# Patient Record
Sex: FEMALE | Race: WHITE | Employment: UNEMPLOYED | URBAN - METROPOLITAN AREA
[De-identification: names, ages, dates, MRNs, and addresses within clinical notes are randomized per-mention and may not be internally consistent; named-entity substitution may affect disease eponyms.]

---

## 2017-01-10 ENCOUNTER — TELEPHONE (OUTPATIENT)
Dept: FAMILY MEDICINE CLINIC | Age: 62
End: 2017-01-10

## 2017-01-10 NOTE — TELEPHONE ENCOUNTER
Call Paula Camp at [facility] got a recording she is out of the office today she is only in the office on M,W,F. Max Sacks for her to return my call in regards to pt.

## 2017-01-11 ENCOUNTER — TELEPHONE (OUTPATIENT)
Dept: FAMILY MEDICINE CLINIC | Age: 62
End: 2017-01-11

## 2017-01-11 DIAGNOSIS — D36.10 SCHWANNOMA: Primary | ICD-10-CM

## 2017-01-11 NOTE — TELEPHONE ENCOUNTER
Spoke with Lacie Ramírez from [place]. She states pt can be seen by her pcp or a specialist. As long as someone is following up on her condition. 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 Information will be faxed to our office. Also pt has to be seen yearly to make sure mass has not regrown or developed. If mass does develop pt would have to see a thoracic surgeon.

## 2017-01-13 NOTE — TELEPHONE ENCOUNTER
Informed pt that CT scan was faxed over to [facility] and that she can stop by to  her records here at the office. Pt states she will stop by next week to  records.

## 2017-02-10 RX ORDER — POTASSIUM CHLORIDE 750 MG/1
10 CAPSULE, EXTENDED RELEASE ORAL 2 TIMES DAILY
Qty: 60 CAP | Refills: 2 | Status: SHIPPED | OUTPATIENT
Start: 2017-02-10 | End: 2017-10-10 | Stop reason: SDUPTHER

## 2017-02-10 NOTE — TELEPHONE ENCOUNTER
Requested Prescriptions     Pending Prescriptions Disp Refills    potassium chloride SA (MICRO-K) 10 mEq capsule 60 Cap 2     Sig: Take 1 Cap by mouth two (2) times a day.

## 2017-02-13 ENCOUNTER — TELEPHONE (OUTPATIENT)
Dept: FAMILY MEDICINE CLINIC | Age: 62
End: 2017-02-13

## 2017-02-13 NOTE — TELEPHONE ENCOUNTER
Per fax from [place] with [facility], pt declined appt request. States refer pt to mental health.

## 2017-02-22 ENCOUNTER — OFFICE VISIT (OUTPATIENT)
Dept: FAMILY MEDICINE CLINIC | Age: 62
End: 2017-02-22

## 2017-02-22 VITALS
HEART RATE: 94 BPM | DIASTOLIC BLOOD PRESSURE: 79 MMHG | HEIGHT: 68 IN | RESPIRATION RATE: 18 BRPM | BODY MASS INDEX: 31.16 KG/M2 | OXYGEN SATURATION: 99 % | WEIGHT: 205.6 LBS | TEMPERATURE: 96.8 F | SYSTOLIC BLOOD PRESSURE: 111 MMHG

## 2017-02-22 DIAGNOSIS — R31.9 URINARY TRACT INFECTION WITH HEMATURIA, SITE UNSPECIFIED: ICD-10-CM

## 2017-02-22 DIAGNOSIS — N39.0 URINARY TRACT INFECTION WITH HEMATURIA, SITE UNSPECIFIED: ICD-10-CM

## 2017-02-22 DIAGNOSIS — M79.10 MUSCLE PAIN: ICD-10-CM

## 2017-02-22 DIAGNOSIS — R30.9 PAINFUL URINATION: Primary | ICD-10-CM

## 2017-02-22 LAB
BILIRUB UR QL STRIP: NEGATIVE
GLUCOSE UR-MCNC: NEGATIVE MG/DL
KETONES P FAST UR STRIP-MCNC: NEGATIVE MG/DL
PH UR STRIP: 7 [PH] (ref 4.6–8)
PROT UR QL STRIP: NEGATIVE MG/DL
SP GR UR STRIP: 1.01 (ref 1–1.03)
UA UROBILINOGEN AMB POC: NORMAL (ref 0.2–1)
URINALYSIS CLARITY POC: CLEAR
URINALYSIS COLOR POC: YELLOW
URINE BLOOD POC: NORMAL
URINE LEUKOCYTES POC: NORMAL
URINE NITRITES POC: NEGATIVE

## 2017-02-22 RX ORDER — INDOMETHACIN 25 MG/1
50 CAPSULE ORAL 3 TIMES DAILY
Qty: 180 CAP | Refills: 2 | Status: SHIPPED | OUTPATIENT
Start: 2017-02-22 | End: 2017-07-21 | Stop reason: SDUPTHER

## 2017-02-22 RX ORDER — SULFAMETHOXAZOLE AND TRIMETHOPRIM 800; 160 MG/1; MG/1
1 TABLET ORAL 2 TIMES DAILY
Qty: 20 TAB | Refills: 0 | Status: SHIPPED | OUTPATIENT
Start: 2017-02-22 | End: 2017-03-04

## 2017-02-22 NOTE — PATIENT INSTRUCTIONS
Don't take potassium while you are taking bactrim. Take Bactrim until finished. Take indocin 25-50mg three times a day as needed for pain. If your urine symptoms get worse or are not much improved we will get imaging next week to check for kidney stone.   Drink plenty of water!!

## 2017-02-22 NOTE — PROGRESS NOTES
Chief Complaint   Patient presents with    Muscle Pain    Dysuria     1 week ago painful urination. Pt states she has not heard any thing from pain management. Ct was not done due to family member being sick. 1. Have you been to the ER, urgent care clinic since your last visit? Hospitalized since your last visit? No    2. Have you seen or consulted any other health care providers outside of the [address] [place] [address] since your last visit? Include any pap smears or colon screening.  No

## 2017-02-22 NOTE — MR AVS SNAPSHOT
Visit Information Date & Time Provider Department Dept. Phone Encounter #  
 2/22/2017 10:00 AM Adali Puente, LIYA [address] [phone] [id number] Upcoming Health Maintenance Date Due Hepatitis C Screening 1955 Pneumococcal 19-64 Medium Risk (1 of 1 - PPSV23) 4/26/1975 DTaP/Tdap/Td series (1 - Tdap) 4/26/1977 PAP AKA CERVICAL CYTOLOGY 4/26/1977 FOBT Q 1 YEAR AGE 50-75 4/26/2006 ZOSTER VACCINE AGE 60> 4/26/2016 BREAST CANCER SCRN MAMMOGRAM 7/30/2016 Allergies as of 2/22/2017  Review Complete On: 2/22/2017 By: Nadara Goldberg, LPN Severity Noted Reaction Type Reactions Morphine High 07/14/2016    Anaphylaxis Morphine  01/17/2013    Other (comments) Current Immunizations  Reviewed on 12/13/2016 Name Date Influenza Vaccine (Quad) PF 12/13/2016 Not reviewed this visit You Were Diagnosed With   
  
 Codes Comments Painful urination    -  Primary ICD-10-CM: R30.9 ICD-9-CM: 788.1 Muscle pain     ICD-10-CM: M79.1 ICD-9-CM: 729.1 Urinary tract infection with hematuria, site unspecified     ICD-10-CM: N39.0, R31.9 ICD-9-CM: 599.0 Vitals BP  
  
  
  
  
  
 111/79 Vitals History BMI and BSA Data Body Mass Index Body Surface Area  
 31.26 kg/m 2 2.12 m 2 Preferred Pharmacy Pharmacy Name Phone [facility] [address], [address] [facility] [phone] Your Updated Medication List  
  
   
This list is accurate as of: 2/22/17 10:22 AM.  Always use your most recent med list.  
  
  
  
  
 ARIPiprazole 15 mg tablet Commonly known as:  ABILIFY Take 15 mg by mouth daily. gabapentin 600 mg tablet Commonly known as:  NEURONTIN Take  by mouth three (3) times daily. hydrOXYzine HCl 50 mg tablet Commonly known as:  ATARAX Take 50 mg by mouth three (3) times daily as needed for Itching. indomethacin 25 mg capsule Commonly known as:  INDOCIN Take 2 Caps by mouth three (3) times daily. lamoTRIgine 25 mg tablet Commonly known as: LaMICtal  
Take  by mouth daily. lisinopril 5 mg tablet Commonly known as:  Carey Pinch Take 1 Tab by mouth daily. potassium chloride SA 10 mEq capsule Commonly known as:  usimilo Fernandezler Take 1 Cap by mouth two (2) times a day. QUEtiapine 50 mg tablet Commonly known as:  SEROquel Take 75 mg by mouth daily. rOPINIRole 0.25 mg tablet Commonly known as:  Shasha Glaze Take  by mouth three (3) times daily. sodium bicarbonate 650 mg tablet Take  by mouth four (4) times daily. trimethoprim-sulfamethoxazole 160-800 mg per tablet Commonly known as:  BACTRIM DS, SEPTRA DS Take 1 Tab by mouth two (2) times a day for 10 days. Prescriptions Sent to Pharmacy Refills  
 indomethacin (INDOCIN) 25 mg capsule 2 Sig: Take 2 Caps by mouth three (3) times daily. Class: Normal  
 Pharmacy: [id number] [address] Ph #: [phone] Route: Oral  
 trimethoprim-sulfamethoxazole (BACTRIM DS, SEPTRA DS) 160-800 mg per tablet 0 Sig: Take 1 Tab by mouth two (2) times a day for 10 days. Class: Normal  
 Pharmacy: [id number] [address] Ph #: [phone] Route: Oral  
  
We Performed the Following AMB POC URINALYSIS DIP STICK AUTO W/ MICRO [67712 CPT(R)] Patient Instructions Don't take potassium while you are taking bactrim. Take Bactrim until finished. Take indocin 25-50mg three times a day as needed for pain. If your urine symptoms get worse or are not much improved we will get imaging next week to check for kidney stone. Drink plenty of water!! Introducing \[facility]\"" & HEALTH SERVICES!    
 Mahi Reyes introduces [employer] patient portal. Now you can access parts of your medical record, email your doctor's office, and request medication refills online. 1. In your internet browser, go to [email or web address] 2. Click on the First Time User? Click Here link in the Sign In box. You will see the New Member Sign Up page. 3. Enter your WIB Access Code exactly as it appears below. You will not need to use this code after youve completed the sign-up process. If you do not sign up before the expiration date, you must request a new code. · WIB Access Code: [email or web address] Expires: 3/13/2017  1:53 PM 
 
4. Enter the last four digits of your Social Security Number (xxxx) and Date of Birth (mm/dd/yyyy) as indicated and click Submit. You will be taken to the next sign-up page. 5. Create a WIB ID. This will be your WIB login ID and cannot be changed, so think of one that is secure and easy to remember. 6. Create a WIB password. You can change your password at any time. 7. Enter your Password Reset Question and Answer. This can be used at a later time if you forget your password. 8. Enter your e-mail address. You will receive e-mail notification when new information is available in [address] 9. Click Sign Up. You can now view and download portions of your medical record. 10. Click the Download Summary menu link to download a portable copy of your medical information. If you have questions, please visit the Frequently Asked Questions section of the WIB website. Remember, WIB is NOT to be used for urgent needs. For medical emergencies, dial 911. Now available from your iPhone and Android! Please provide this summary of care documentation to your next provider. Your primary care clinician is listed as Kai Burnett. If you have any questions after today's visit, please call [phone].

## 2017-02-23 NOTE — PROGRESS NOTES
Chief Complaint   Patient presents with    Muscle Pain    Dysuria     she is a 62y.o. year old female who presents for evaluation of lower abd pain/dysuria feels like previous UTIs. Blood noted in urine yesterday. Has not been taking NSAIDs. Pt also denied pain management at this time and CT denied coverage - will follow up after [employer] coverage. Discussed possible early UTI or kidney stone, gave pt info and symptoms for worsening or reasons to return. Will treat with abx and NSAIDs and close follow up/imaging for any worsening. Reviewed and agree with Nurse Note duplicated in this note. Reviewed PmHx, RxHx, FmHx, SocHx, AllgHx and updated in chart. Patient Labs were reviewed: yes    Patient Past Records were reviewed:  yes    Review of Systems - negative except as listed above    Objective:     Vitals:    02/22/17 0951   BP: 111/79   Pulse: 94   Resp: 18   Temp: 96.8 °F (36 °C)   TempSrc: Oral   SpO2: 99%   Weight: 205 lb 9.6 oz (93.3 kg)   Height: 5' 8\" (1.727 m)     Physical Examination: General appearance - alert, well appearing, and in no distress  Mental status - alert, oriented to person, place, and time  Chest - clear to auscultation, no wheezes, rales or rhonchi, symmetric air entry  Heart - normal rate, regular rhythm, normal S1, S2, no murmurs, rubs, clicks or gallops  Abdomen - soft, nontender, nondistended, no masses or organomegaly  Extremities - peripheral pulses normal, no pedal edema, no clubbing or cyanosis    Assessment/ Plan:   Thalia Gutierrez was seen today for muscle pain and dysuria. Diagnoses and all orders for this visit:    Painful urination  -     AMB POC URINALYSIS DIP STICK AUTO W/ MICRO    Muscle pain  -     indomethacin (INDOCIN) 25 mg capsule; Take 2 Caps by mouth three (3) times daily. Urinary tract infection with hematuria, site unspecified  -     trimethoprim-sulfamethoxazole (BACTRIM DS, SEPTRA DS) 160-800 mg per tablet;  Take 1 Tab by mouth two (2) times a day for 10 days.       Encounter Diagnoses   Name Primary?  Painful urination Yes    Muscle pain     Urinary tract infection with hematuria, site unspecified      Orders Placed This Encounter    AMB POC URINALYSIS DIP STICK AUTO W/ MICRO    indomethacin (INDOCIN) 25 mg capsule    trimethoprim-sulfamethoxazole (BACTRIM DS, SEPTRA DS) 160-800 mg per tablet     Follow-up Disposition: Not on File  Patient Instructions   Don't take potassium while you are taking bactrim. Take Bactrim until finished. Take indocin 25-50mg three times a day as needed for pain. If your urine symptoms get worse or are not much improved we will get imaging next week to check for kidney stone. Drink plenty of water!!        I have discussed the diagnosis with the patient and the intended plan as seen in the above orders. The patient has received an After-Visit Summary and questions were answered concerning future plans.      Medication Side Effects and Warnings were discussed with patient: yes      Marli Moya NP-C  [employer]

## 2017-02-24 ENCOUNTER — TELEPHONE (OUTPATIENT)
Dept: FAMILY MEDICINE CLINIC | Age: 62
End: 2017-02-24

## 2017-02-24 NOTE — TELEPHONE ENCOUNTER
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

## 2017-02-27 ENCOUNTER — TELEPHONE (OUTPATIENT)
Dept: FAMILY MEDICINE CLINIC | Age: 62
End: 2017-02-27

## 2017-04-20 ENCOUNTER — OFFICE VISIT (OUTPATIENT)
Dept: FAMILY MEDICINE CLINIC | Age: 62
End: 2017-04-20

## 2017-04-20 VITALS
TEMPERATURE: 98.2 F | RESPIRATION RATE: 18 BRPM | WEIGHT: 205 LBS | HEIGHT: 68 IN | SYSTOLIC BLOOD PRESSURE: 146 MMHG | HEART RATE: 110 BPM | OXYGEN SATURATION: 98 % | DIASTOLIC BLOOD PRESSURE: 111 MMHG | BODY MASS INDEX: 31.07 KG/M2

## 2017-04-20 DIAGNOSIS — M79.604 CHRONIC PAIN OF BOTH LOWER EXTREMITIES: ICD-10-CM

## 2017-04-20 DIAGNOSIS — M79.605 CHRONIC PAIN OF BOTH LOWER EXTREMITIES: ICD-10-CM

## 2017-04-20 DIAGNOSIS — K59.03 DRUG-INDUCED CONSTIPATION: ICD-10-CM

## 2017-04-20 DIAGNOSIS — G89.29 CHRONIC PAIN OF BOTH LOWER EXTREMITIES: ICD-10-CM

## 2017-04-20 DIAGNOSIS — N39.0 URINARY TRACT INFECTION WITH HEMATURIA, SITE UNSPECIFIED: Primary | ICD-10-CM

## 2017-04-20 DIAGNOSIS — R31.9 URINARY TRACT INFECTION WITH HEMATURIA, SITE UNSPECIFIED: Primary | ICD-10-CM

## 2017-04-20 LAB
BILIRUB UR QL STRIP: NEGATIVE
GLUCOSE UR-MCNC: NEGATIVE MG/DL
KETONES P FAST UR STRIP-MCNC: NEGATIVE MG/DL
PH UR STRIP: 6.5 [PH] (ref 4.6–8)
PROT UR QL STRIP: NORMAL MG/DL
SP GR UR STRIP: 1.02 (ref 1–1.03)
UA UROBILINOGEN AMB POC: NORMAL (ref 0.2–1)
URINALYSIS CLARITY POC: NORMAL
URINALYSIS COLOR POC: NORMAL
URINE BLOOD POC: NORMAL
URINE LEUKOCYTES POC: NORMAL
URINE NITRITES POC: NEGATIVE

## 2017-04-20 RX ORDER — CIPROFLOXACIN 500 MG/1
500 TABLET ORAL 2 TIMES DAILY
Qty: 10 TAB | Refills: 0 | Status: SHIPPED | OUTPATIENT
Start: 2017-04-20 | End: 2017-04-25

## 2017-04-20 RX ORDER — TRAMADOL HYDROCHLORIDE 50 MG/1
50 TABLET ORAL
Qty: 48 TAB | Refills: 0 | Status: SHIPPED | OUTPATIENT
Start: 2017-04-20 | End: 2017-08-18 | Stop reason: ALTCHOICE

## 2017-04-20 NOTE — PATIENT INSTRUCTIONS
It can help by Drinking at least 8 glasses of water a day    - Take a Stool Softener every day:  (eg - Dulcolax Stool Softener 100 mg or Miralax)  - Eat Plenty of Fiber   Keep your fiber intake to at least 20 grams a day   Fiber One products, Benefiber or Metamucil      If you get constipated:  1. Start with a Stool Softener (produces a bowel movement in 72 hr):   Examples:    Miralax 1 capful twice a day (It's OK to go up to 3 caps for a few days)    Dulcolax Stool Softener 100 mg    2. If you still have constipation after 2 or 3 days, add a Stimulant Laxative to the Stool Softener (this will produce a bowel movement in 6 - 12 hr):   Examples:    Exlax (1 small square) for up to 4 days    Dulcolax stimulant laxative    Magnesium citrate pill 500 mg start with once a day and go up to 3 times a day if needed    3. Or you can go straight to a combination Stool Softner / Stimulant at night. If you need, you can add a morning dose. Examples:    Pericolace 50 mg / 8.25 mg    Sennakot-S  50 mg / 8.25 mg    4. If You're Really locked up, get Liquid Magnesium Citrate (take according to the      directions)      Call your insurance company and tell them you have been referred to pain management and need to find a doctor that will take your insurance. Once you find one, let us know and we will send the paperwork to them. I will start you on ultram for short term pain, will only refill x 1, so use sparingly as needed. Continue with indocin as baseline medication for pain. Take antibiotic until finished. Drink plenty of water, this will help you with Uti as well as constipation. Chronic Pain: Care Instructions  Your Care Instructions  Chronic pain is pain that lasts a long time (months or even years) and may or may not have a clear cause. It is different from acute pain, which usually does have a clear cause--like an injury or illness--and gets better over time.  Chronic pain:  · Lasts over time but may vary from day to day. · Does not go away despite efforts to end it. · May disrupt your sleep and lead to fatigue. · May cause depression or anxiety. · May make your muscles tense, causing more pain. · Can disrupt your work, hobbies, home life, and relationships with friends and family. Chronic pain is a very real condition. It is not just in your head. Treatment can help and usually includes several methods used together, such as medicines, physical therapy, exercise, and other treatments. Learning how to relax and changing negative thought patterns can also help you cope. Chronic pain is complex. Taking an active role in your treatment will help you better manage your pain. Tell your doctor if you have trouble dealing with your pain. You may have to try several things before you find what works best for you. Follow-up care is a key part of your treatment and safety. Be sure to make and go to all appointments, and call your doctor if you are having problems. Its also a good idea to know your test results and keep a list of the medicines you take. How can you care for yourself at home? · Pace yourself. Break up large jobs into smaller tasks. Save harder tasks for days when you have less pain, or go back and forth between hard tasks and easier ones. Take rest breaks. · Relax, and reduce stress. Relaxation techniques such as deep breathing or meditation can help. · Keep moving. Gentle, daily exercise can help reduce pain over the long run. Try low- or no-impact exercises such as walking, swimming, and stationary biking. Do stretches to stay flexible. · Try heat, cold packs, and massage. · Get enough sleep. Chronic pain can make you tired and drain your energy. Talk with your doctor if you have trouble sleeping because of pain. · Think positive. Your thoughts can affect your pain level. Do things that you enjoy to distract yourself when you have pain instead of focusing on the pain.  See a movie, read a book, listen to music, or spend time with a friend. · If you think you are depressed, talk to your doctor about treatment. · Keep a daily pain diary. Record how your moods, thoughts, sleep patterns, activities, and medicine affect your pain. You may find that your pain is worse during or after certain activities or when you are feeling a certain emotion. Having a record of your pain can help you and your doctor find the best ways to treat your pain. · Take pain medicines exactly as directed. ¨ If the doctor gave you a prescription medicine for pain, take it as prescribed. ¨ If you are not taking a prescription pain medicine, ask your doctor if you can take an over-the-counter medicine. Reducing constipation caused by pain medicine  · Include fruits, vegetables, beans, and whole grains in your diet each day. These foods are high in fiber. · Drink plenty of fluids, enough so that your urine is light yellow or clear like water. If you have kidney, heart, or liver disease and have to limit fluids, talk with your doctor before you increase the amount of fluids you drink. · If your doctor recommends it, get more exercise. Walking is a good choice. Bit by bit, increase the amount you walk every day. Try for at least 30 minutes on most days of the week. · Schedule time each day for a bowel movement. A daily routine may help. Take your time and do not strain when having a bowel movement. When should you call for help? Call your doctor now or seek immediate medical care if:  · Your pain gets worse or is out of control. · You feel down or blue, or you do not enjoy things like you once did. You may be depressed, which is common in people with chronic pain. Depression can be treated. · You have vomiting or cramps for more than 2 hours. Watch closely for changes in your health, and be sure to contact your doctor if:  · You cannot sleep because of pain.   · You are very worried or anxious about your pain.  · You have trouble taking your pain medicine. · You have any concerns about your pain medicine. · You have trouble with bowel movements, such as:  ¨ No bowel movement in 3 days. ¨ Blood in the anal area, in your stool, or on the toilet paper. ¨ Diarrhea for more than 24 hours. Where can you learn more? Go to http://paulette-faizan.info/. Enter N004 in the search box to learn more about \"Chronic Pain: Care Instructions. \"  Current as of: October 14, 2016  Content Version: 11.2  © 1172-7563 [employer]. Care instructions adapted under license by [employer] (which disclaims liability or warranty for this information). If you have questions about a medical condition or this instruction, always ask your healthcare professional. [address] any warranty or liability for your use of this information.

## 2017-04-20 NOTE — MR AVS SNAPSHOT
Visit Information Date & Time Provider Department Dept. Phone Encounter #  
 4/20/2017  9:30 AM Sully Eli NP [address] [facility] [phone] [id number] Your Appointments 5/22/2017  9:30 AM  
ROUTINE CARE with Sully Eli NP [address] [facility] ([address]) Appt Note: 3 mo FU  
 [address] [facility] [address] [address]  
[phone]  
  
   
 [address] [facility] [address] Upcoming Health Maintenance Date Due Hepatitis C Screening 1955 Pneumococcal 19-64 Medium Risk (1 of 1 - PPSV23) 4/26/1975 DTaP/Tdap/Td series (1 - Tdap) 4/26/1977 PAP AKA CERVICAL CYTOLOGY 4/26/1977 FOBT Q 1 YEAR AGE 50-75 4/26/2006 ZOSTER VACCINE AGE 60> 4/26/2016 BREAST CANCER SCRN MAMMOGRAM 7/30/2016 Allergies as of 4/20/2017  Review Complete On: 4/20/2017 By: Jerzy De Los Santos LPN Severity Noted Reaction Type Reactions Morphine High 07/14/2016    Anaphylaxis Morphine  01/17/2013    Other (comments) Current Immunizations  Reviewed on 12/13/2016 Name Date Influenza Vaccine (Quad) PF 12/13/2016 Not reviewed this visit You Were Diagnosed With   
  
 Codes Comments Urinary tract infection with hematuria, site unspecified    -  Primary ICD-10-CM: N39.0, R31.9 ICD-9-CM: 599.0 Chronic pain of both lower extremities     ICD-10-CM: M79.604, M79.605, G89.29 ICD-9-CM: 729.5, 338.29 Drug-induced constipation     ICD-10-CM: K59.03 
ICD-9-CM: 564.09, E980.5 Vitals BP Pulse Temp Resp Height(growth percentile) Weight(growth percentile) (!) 146/111 (!) 110 98.2 °F (36.8 °C) 18 5' 8\" (1.727 m) 205 lb (93 kg) SpO2 BMI OB Status Smoking Status 98% 31.17 kg/m2 Hysterectomy Current Every Day Smoker Vitals History BMI and BSA Data Body Mass Index Body Surface Area  
 31.17 kg/m 2 2.11 m 2 Preferred Pharmacy Pharmacy Name Phone [facility] [address], [address] [place] [phone] Your Updated Medication List  
  
   
This list is accurate as of: 4/20/17 10:14 AM.  Always use your most recent med list.  
  
  
  
  
 ARIPiprazole 15 mg tablet Commonly known as:  ABILIFY Take 15 mg by mouth daily. ciprofloxacin HCl 500 mg tablet Commonly known as:  CIPRO Take 1 Tab by mouth two (2) times a day for 5 days. gabapentin 600 mg tablet Commonly known as:  NEURONTIN Take  by mouth three (3) times daily. hydrOXYzine HCl 50 mg tablet Commonly known as:  ATARAX Take 50 mg by mouth three (3) times daily as needed for Itching. indomethacin 25 mg capsule Commonly known as:  INDOCIN Take 2 Caps by mouth three (3) times daily. lamoTRIgine 25 mg tablet Commonly known as: LaMICtal  
Take  by mouth daily. lisinopril 5 mg tablet Commonly known as:  Baljinder Lofty Take 1 Tab by mouth daily. potassium chloride SA 10 mEq capsule Commonly known as:  Maylin Rissa Take 1 Cap by mouth two (2) times a day. QUEtiapine 50 mg tablet Commonly known as:  SEROquel Take 75 mg by mouth daily. rOPINIRole 0.25 mg tablet Commonly known as:  Chantel Eyad Take  by mouth three (3) times daily. sodium bicarbonate 650 mg tablet Take  by mouth four (4) times daily. traMADol 50 mg tablet Commonly known as:  ULTRAM  
Take 1 Tab by mouth two (2) times daily as needed for Pain. Max Daily Amount: 100 mg. Prescriptions Printed Refills  
 traMADol (ULTRAM) 50 mg tablet 0 Sig: Take 1 Tab by mouth two (2) times daily as needed for Pain. Max Daily Amount: 100 mg. Class: Print Route: Oral  
  
Prescriptions Sent to Pharmacy Refills  
 ciprofloxacin HCl (CIPRO) 500 mg tablet 0 Sig: Take 1 Tab by mouth two (2) times a day for 5 days.   
 Class: Normal  
 [id number]: [phone] Route: Oral  
  
Patient Instructions It can help by Drinking at least 8 glasses of water a day - Take a Stool Softener every day:  (eg - Dulcolax Stool Softener 100 mg or Miralax) - Eat Plenty of Fiber Keep your fiber intake to at least 20 grams a day Fiber One products, Benefiber or Metamucil If you get constipated: 1. Start with a Stool Softener (produces a bowel movement in 72 hr): 
 Examples: 
  Miralax 1 capful twice a day (It's OK to go up to 3 caps for a few days) Dulcolax Stool Softener 100 mg 
 
2. If you still have constipation after 2 or 3 days, add a Stimulant Laxative to the Stool Softener (this will produce a bowel movement in 6 - 12 hr): 
 Examples: 
  Exlax (1 small square) for up to 4 days Dulcolax stimulant laxative Magnesium citrate pill 500 mg start with once a day and go up to 3 times a day if needed 3. Or you can go straight to a combination Stool Softner / Stimulant at night. If you need, you can add a morning dose. Examples: 
  Pericolace 50 mg / 8.25 mg Sennakot-S  50 mg / 8.25 mg 
 
4. If You're Really locked up, get Liquid Magnesium Citrate (take according to the      directions) Call your insurance company and tell them you have been referred to pain management and need to find a doctor that will take your insurance. Once you find one, let us know and we will send the paperwork to them. I will start you on ultram for short term pain, will only refill x 1, so use sparingly as needed. Continue with indocin as baseline medication for pain. Take antibiotic until finished. Drink plenty of water, this will help you with Uti as well as constipation. Chronic Pain: Care Instructions Your Care Instructions Chronic pain is pain that lasts a long time (months or even years) and may or may not have a clear cause. It is different from acute pain, which usually does have a clear causelike an injury or illnessand gets better over time. Chronic pain: 
· Lasts over time but may vary from day to day. · Does not go away despite efforts to end it. · May disrupt your sleep and lead to fatigue. · May cause depression or anxiety. · May make your muscles tense, causing more pain. · Can disrupt your work, hobbies, home life, and relationships with friends and family. Chronic pain is a very real condition. It is not just in your head. Treatment can help and usually includes several methods used together, such as medicines, physical therapy, exercise, and other treatments. Learning how to relax and changing negative thought patterns can also help you cope. Chronic pain is complex. Taking an active role in your treatment will help you better manage your pain. Tell your doctor if you have trouble dealing with your pain. You may have to try several things before you find what works best for you. Follow-up care is a key part of your treatment and safety. Be sure to make and go to all appointments, and call your doctor if you are having problems. Its also a good idea to know your test results and keep a list of the medicines you take. How can you care for yourself at home? · Pace yourself. Break up large jobs into smaller tasks. Save harder tasks for days when you have less pain, or go back and forth between hard tasks and easier ones. Take rest breaks. · Relax, and reduce stress. Relaxation techniques such as deep breathing or meditation can help. · Keep moving. Gentle, daily exercise can help reduce pain over the long run. Try low- or no-impact exercises such as walking, swimming, and stationary biking. Do stretches to stay flexible. · Try heat, cold packs, and massage. · Get enough sleep. Chronic pain can make you tired and drain your energy. Talk with your doctor if you have trouble sleeping because of pain. · Think positive. Your thoughts can affect your pain level. Do things that you enjoy to distract yourself when you have pain instead of focusing on the pain. See a movie, read a book, listen to music, or spend time with a friend. · If you think you are depressed, talk to your doctor about treatment. · Keep a daily pain diary. Record how your moods, thoughts, sleep patterns, activities, and medicine affect your pain. You may find that your pain is worse during or after certain activities or when you are feeling a certain emotion. Having a record of your pain can help you and your doctor find the best ways to treat your pain. · Take pain medicines exactly as directed. ¨ If the doctor gave you a prescription medicine for pain, take it as prescribed. ¨ If you are not taking a prescription pain medicine, ask your doctor if you can take an over-the-counter medicine. Reducing constipation caused by pain medicine · Include fruits, vegetables, beans, and whole grains in your diet each day. These foods are high in fiber. · Drink plenty of fluids, enough so that your urine is light yellow or clear like water. If you have kidney, heart, or liver disease and have to limit fluids, talk with your doctor before you increase the amount of fluids you drink. · If your doctor recommends it, get more exercise. Walking is a good choice. Bit by bit, increase the amount you walk every day. Try for at least 30 minutes on most days of the week. · Schedule time each day for a bowel movement. A daily routine may help. Take your time and do not strain when having a bowel movement. When should you call for help? Call your doctor now or seek immediate medical care if: 
· Your pain gets worse or is out of control. · You feel down or blue, or you do not enjoy things like you once did. You may be depressed, which is common in people with chronic pain.  Depression can be treated. · You have vomiting or cramps for more than 2 hours. Watch closely for changes in your health, and be sure to contact your doctor if: 
· You cannot sleep because of pain. · You are very worried or anxious about your pain. · You have trouble taking your pain medicine. · You have any concerns about your pain medicine. · You have trouble with bowel movements, such as: 
¨ No bowel movement in 3 days. ¨ Blood in the anal area, in your stool, or on the toilet paper. ¨ Diarrhea for more than 24 hours. Where can you learn more? Go to http://paulette-faizan.info/. Enter N004 in the search box to learn more about \"Chronic Pain: Care Instructions. \" Current as of: October 14, 2016 Content Version: 11.2 © 7441-9072 [employer]. Care instructions adapted under license by [employer] (which disclaims liability or warranty for this information). If you have questions about a medical condition or this instruction, always ask your healthcare professional. [place] [address] any warranty or liability for your use of this information. Introducing [facility]! Zoie Morse introduces Preedo patient portal. Now you can access parts of your medical record, email your doctor's office, and request medication refills online. 1. In your internet browser, go to [email or web address] 2. Click on the First Time User? Click Here link in the Sign In box. You will see the New Member Sign Up page. 3. Enter your Preedo Access Code exactly as it appears below. You will not need to use this code after youve completed the sign-up process. If you do not sign up before the expiration date, you must request a new code. · Preedo Access Code: [email or web address] Expires: 7/19/2017 10:10 AM 
 
4. Enter the last four digits of your Social Security Number (xxxx) and Date of Birth (mm/dd/yyyy) as indicated and click Submit.  You will be taken to the next sign-up page. 5. Create a Vuze ID. This will be your Vuze login ID and cannot be changed, so think of one that is secure and easy to remember. 6. Create a Vuze password. You can change your password at any time. 7. Enter your Password Reset Question and Answer. This can be used at a later time if you forget your password. 8. Enter your e-mail address. You will receive e-mail notification when new information is available in [address]. 9. Click Sign Up. You can now view and download portions of your medical record. 10. Click the Download Summary menu link to download a portable copy of your medical information. If you have questions, please visit the Frequently Asked Questions section of the Vuze website. Remember, Vuze is NOT to be used for urgent needs. For medical emergencies, dial 911. Now available from your iPhone and Android! Please provide this summary of care documentation to your next provider. Your primary care clinician is listed as [place] Antoine. If you have any questions after today's visit, please call [phone].

## 2017-04-20 NOTE — PROGRESS NOTES
Chief Complaint   Patient presents with    Blood in Urine    Knee Pain       2 weeks. 1. Have you been to the ER, urgent care clinic since your last visit? Hospitalized since your last visit? No    2. Have you seen or consulted any other health care providers outside of the [address] [place] [address] since your last visit? Include any pap smears or colon screening.  No  .

## 2017-04-21 NOTE — PROGRESS NOTES
Chief Complaint   Patient presents with    Blood in Urine    Knee Pain     she is a 62y.o. year old female who presents for evaluation of increased urine frequency, dysuria, blood in urine and chronic B knee pain. Pt with 3 days urine symptoms, no fevers, dizziness, lighheadedness, cp, sob, cough, n/v/d. Pt is complaining of frequent constipation, still having BMs just hard and every 2-3 days. Knee pain is not controlled on current medications. Was on ultram in past with good relief. Reviewed and agree with Nurse Note duplicated in this note. Reviewed PmHx, RxHx, FmHx, SocHx, AllgHx and updated in chart. Patient Labs were reviewed: yes    Patient Past Records were reviewed:  yes    Review of Systems - negative except as listed above    Objective:     Vitals:    04/20/17 0940   BP: (!) 146/111   Pulse: (!) 110   Resp: 18   Temp: 98.2 °F (36.8 °C)   SpO2: 98%   Weight: 205 lb (93 kg)   Height: 5' 8\" (1.727 m)     Physical Examination: General appearance - alert, well appearing, and in no distress  Mental status - alert, oriented to person, place, and time  Eyes - pupils equal and reactive, extraocular eye movements intact  Neck - supple, no significant adenopathy  Lymphatics - no palpable lymphadenopathy, no hepatosplenomegaly  Chest - clear to auscultation, no wheezes, rales or rhonchi, symmetric air entry  Heart - normal rate, regular rhythm, normal S1, S2, no murmurs, rubs, clicks or gallops  Abdomen - soft, nontender, nondistended, no masses or organomegaly  no CVA tenderness  Musculoskeletal - B knees with no swelling, deformity, full passive rom with no pain, active rom with pain medial patella B, no crepitis. Extremities - peripheral pulses normal, no pedal edema, no clubbing or cyanosis    Assessment/ Plan:   Jennifer Dickey was seen today for blood in urine and knee pain.     Diagnoses and all orders for this visit:    Urinary tract infection with hematuria, site unspecified  -     ciprofloxacin HCl (CIPRO) 500 mg tablet; Take 1 Tab by mouth two (2) times a day for 5 days.  -     AMB POC URINALYSIS DIP STICK AUTO W/ MICRO    Chronic pain of both lower extremities    Drug-induced constipation    Other orders  -     traMADol (ULTRAM) 50 mg tablet; Take 1 Tab by mouth two (2) times daily as needed for Pain. Max Daily Amount: 100 mg. Encounter Diagnoses   Name Primary?  Urinary tract infection with hematuria, site unspecified Yes    Chronic pain of both lower extremities     Drug-induced constipation      Orders Placed This Encounter    AMB POC URINALYSIS DIP STICK AUTO W/ MICRO    traMADol (ULTRAM) 50 mg tablet    ciprofloxacin HCl (CIPRO) 500 mg tablet     Follow-up Disposition:  Return in about 3 months (around 7/20/2017). Patient Instructions     It can help by Drinking at least 8 glasses of water a day    - Take a Stool Softener every day:  (eg - Dulcolax Stool Softener 100 mg or Miralax)  - Eat Plenty of Fiber   Keep your fiber intake to at least 20 grams a day   Fiber One products, Benefiber or Metamucil      If you get constipated:  1. Start with a Stool Softener (produces a bowel movement in 72 hr):   Examples:    Miralax 1 capful twice a day (It's OK to go up to 3 caps for a few days)    Dulcolax Stool Softener 100 mg    2. If you still have constipation after 2 or 3 days, add a Stimulant Laxative to the Stool Softener (this will produce a bowel movement in 6 - 12 hr):   Examples:    Exlax (1 small square) for up to 4 days    Dulcolax stimulant laxative    Magnesium citrate pill 500 mg start with once a day and go up to 3 times a day if needed    3. Or you can go straight to a combination Stool Softner / Stimulant at night. If you need, you can add a morning dose. Examples:    Pericolace 50 mg / 8.25 mg    Sennakot-S  50 mg / 8.25 mg    4.   If You're Really locked up, get Liquid Magnesium Citrate (take according to the      directions)      Call your insurance company and tell them you have been referred to pain management and need to find a doctor that will take your insurance. Once you find one, let us know and we will send the paperwork to them. I will start you on ultram for short term pain, will only refill x 1, so use sparingly as needed. Continue with indocin as baseline medication for pain. Take antibiotic until finished. Drink plenty of water, this will help you with Uti as well as constipation. Chronic Pain: Care Instructions  Your Care Instructions  Chronic pain is pain that lasts a long time (months or even years) and may or may not have a clear cause. It is different from acute pain, which usually does have a clear cause--like an injury or illness--and gets better over time. Chronic pain:  · Lasts over time but may vary from day to day. · Does not go away despite efforts to end it. · May disrupt your sleep and lead to fatigue. · May cause depression or anxiety. · May make your muscles tense, causing more pain. · Can disrupt your work, hobbies, home life, and relationships with friends and family. Chronic pain is a very real condition. It is not just in your head. Treatment can help and usually includes several methods used together, such as medicines, physical therapy, exercise, and other treatments. Learning how to relax and changing negative thought patterns can also help you cope. Chronic pain is complex. Taking an active role in your treatment will help you better manage your pain. Tell your doctor if you have trouble dealing with your pain. You may have to try several things before you find what works best for you. Follow-up care is a key part of your treatment and safety. Be sure to make and go to all appointments, and call your doctor if you are having problems. Its also a good idea to know your test results and keep a list of the medicines you take. How can you care for yourself at home? · Pace yourself.  Break up large jobs into smaller tasks. Save harder tasks for days when you have less pain, or go back and forth between hard tasks and easier ones. Take rest breaks. · Relax, and reduce stress. Relaxation techniques such as deep breathing or meditation can help. · Keep moving. Gentle, daily exercise can help reduce pain over the long run. Try low- or no-impact exercises such as walking, swimming, and stationary biking. Do stretches to stay flexible. · Try heat, cold packs, and massage. · Get enough sleep. Chronic pain can make you tired and drain your energy. Talk with your doctor if you have trouble sleeping because of pain. · Think positive. Your thoughts can affect your pain level. Do things that you enjoy to distract yourself when you have pain instead of focusing on the pain. See a movie, read a book, listen to music, or spend time with a friend. · If you think you are depressed, talk to your doctor about treatment. · Keep a daily pain diary. Record how your moods, thoughts, sleep patterns, activities, and medicine affect your pain. You may find that your pain is worse during or after certain activities or when you are feeling a certain emotion. Having a record of your pain can help you and your doctor find the best ways to treat your pain. · Take pain medicines exactly as directed. ¨ If the doctor gave you a prescription medicine for pain, take it as prescribed. ¨ If you are not taking a prescription pain medicine, ask your doctor if you can take an over-the-counter medicine. Reducing constipation caused by pain medicine  · Include fruits, vegetables, beans, and whole grains in your diet each day. These foods are high in fiber. · Drink plenty of fluids, enough so that your urine is light yellow or clear like water. If you have kidney, heart, or liver disease and have to limit fluids, talk with your doctor before you increase the amount of fluids you drink. · If your doctor recommends it, get more exercise.  Walking is a good choice. Bit by bit, increase the amount you walk every day. Try for at least 30 minutes on most days of the week. · Schedule time each day for a bowel movement. A daily routine may help. Take your time and do not strain when having a bowel movement. When should you call for help? Call your doctor now or seek immediate medical care if:  · Your pain gets worse or is out of control. · You feel down or blue, or you do not enjoy things like you once did. You may be depressed, which is common in people with chronic pain. Depression can be treated. · You have vomiting or cramps for more than 2 hours. Watch closely for changes in your health, and be sure to contact your doctor if:  · You cannot sleep because of pain. · You are very worried or anxious about your pain. · You have trouble taking your pain medicine. · You have any concerns about your pain medicine. · You have trouble with bowel movements, such as:  ¨ No bowel movement in 3 days. ¨ Blood in the anal area, in your stool, or on the toilet paper. ¨ Diarrhea for more than 24 hours. Where can you learn more? Go to http://paulette-faizan.info/. Enter N004 in the search box to learn more about \"Chronic Pain: Care Instructions. \"  Current as of: October 14, 2016  Content Version: 11.2  © 3873-1315 [employer]. Care instructions adapted under license by [employer] (which disclaims liability or warranty for this information). If you have questions about a medical condition or this instruction, always ask your healthcare professional. [place] [address] any warranty or liability for your use of this information. I have discussed the diagnosis with the patient and the intended plan as seen in the above orders. The patient has received an After-Visit Summary and questions were answered concerning future plans.      Medication Side Effects and Warnings were discussed with patient: yes      Awilda Villarreal NP-C  [facility]

## 2017-05-03 ENCOUNTER — TELEPHONE (OUTPATIENT)
Dept: FAMILY MEDICINE CLINIC | Age: 62
End: 2017-05-03

## 2017-05-03 RX ORDER — SULFAMETHOXAZOLE AND TRIMETHOPRIM 800; 160 MG/1; MG/1
1 TABLET ORAL 2 TIMES DAILY
Qty: 14 TAB | Refills: 0 | Status: SHIPPED | OUTPATIENT
Start: 2017-05-03 | End: 2017-05-10

## 2017-05-03 NOTE — TELEPHONE ENCOUNTER
Pt states that her uti has gotten worst and she is in a lot of pain. Pt was wondering if something could be called in. Informed pt that message would be sent to provider.

## 2017-05-03 NOTE — TELEPHONE ENCOUNTER
Pt returnning call to nurse Ning Gutierrez. States really need to speak with her. States available now.

## 2017-07-21 DIAGNOSIS — M79.10 MUSCLE PAIN: ICD-10-CM

## 2017-07-21 RX ORDER — INDOMETHACIN 25 MG/1
50 CAPSULE ORAL 3 TIMES DAILY
Qty: 180 CAP | Refills: 2 | Status: SHIPPED | OUTPATIENT
Start: 2017-07-21 | End: 2017-08-18 | Stop reason: ALTCHOICE

## 2017-07-21 NOTE — TELEPHONE ENCOUNTER
Requested Prescriptions     Pending Prescriptions Disp Refills    indomethacin (INDOCIN) 25 mg capsule 180 Cap 2     Sig: Take 2 Caps by mouth three (3) times daily.

## 2017-07-24 ENCOUNTER — OFFICE VISIT (OUTPATIENT)
Dept: FAMILY MEDICINE CLINIC | Age: 62
End: 2017-07-24

## 2017-07-24 ENCOUNTER — HOSPITAL ENCOUNTER (OUTPATIENT)
Dept: LAB | Age: 62
Discharge: HOME | End: 2017-07-24
Payer: MEDICAID

## 2017-07-24 VITALS
SYSTOLIC BLOOD PRESSURE: 101 MMHG | DIASTOLIC BLOOD PRESSURE: 78 MMHG | HEIGHT: 68 IN | RESPIRATION RATE: 18 BRPM | WEIGHT: 201 LBS | TEMPERATURE: 96.7 F | HEART RATE: 91 BPM | OXYGEN SATURATION: 98 % | BODY MASS INDEX: 30.46 KG/M2

## 2017-07-24 DIAGNOSIS — E04.1 THYROID NODULE: ICD-10-CM

## 2017-07-24 DIAGNOSIS — I10 ESSENTIAL HYPERTENSION WITH GOAL BLOOD PRESSURE LESS THAN 140/90: ICD-10-CM

## 2017-07-24 DIAGNOSIS — R42 DIZZINESS: Primary | ICD-10-CM

## 2017-07-24 DIAGNOSIS — R42 DIZZINESS: ICD-10-CM

## 2017-07-24 DIAGNOSIS — R42 VERTIGO: ICD-10-CM

## 2017-07-24 LAB
ALBUMIN SERPL BCP-MCNC: 3.7 G/DL (ref 3.4–5)
ALBUMIN/GLOB SERPL: 1.2 {RATIO} (ref 0.8–1.7)
ALP SERPL-CCNC: 63 U/L (ref 45–117)
ALT SERPL-CCNC: 20 U/L (ref 13–56)
ANION GAP BLD CALC-SCNC: 8 MMOL/L (ref 3–18)
AST SERPL W P-5'-P-CCNC: 14 U/L (ref 15–37)
BILIRUB SERPL-MCNC: 0.3 MG/DL (ref 0.2–1)
BUN SERPL-MCNC: 23 MG/DL (ref 7–18)
BUN/CREAT SERPL: 29 (ref 12–20)
CALCIUM SERPL-MCNC: 9.5 MG/DL (ref 8.5–10.1)
CHLORIDE SERPL-SCNC: 108 MMOL/L (ref 100–108)
CO2 SERPL-SCNC: 27 MMOL/L (ref 21–32)
CREAT SERPL-MCNC: 0.8 MG/DL (ref 0.6–1.3)
GLOBULIN SER CALC-MCNC: 3.1 G/DL (ref 2–4)
GLUCOSE SERPL-MCNC: 77 MG/DL (ref 74–99)
MAGNESIUM SERPL-MCNC: 2.1 MG/DL (ref 1.6–2.6)
POTASSIUM SERPL-SCNC: 4.6 MMOL/L (ref 3.5–5.5)
PROT SERPL-MCNC: 6.8 G/DL (ref 6.4–8.2)
SODIUM SERPL-SCNC: 143 MMOL/L (ref 136–145)
T4 FREE SERPL-MCNC: 0.9 NG/DL (ref 0.7–1.5)
TSH SERPL DL<=0.05 MIU/L-ACNC: 1.29 UIU/ML (ref 0.36–3.74)

## 2017-07-24 PROCEDURE — 80053 COMPREHEN METABOLIC PANEL: CPT | Performed by: NURSE PRACTITIONER

## 2017-07-24 PROCEDURE — 84443 ASSAY THYROID STIM HORMONE: CPT | Performed by: NURSE PRACTITIONER

## 2017-07-24 PROCEDURE — 84439 ASSAY OF FREE THYROXINE: CPT | Performed by: NURSE PRACTITIONER

## 2017-07-24 PROCEDURE — 83735 ASSAY OF MAGNESIUM: CPT | Performed by: NURSE PRACTITIONER

## 2017-07-24 RX ORDER — MECLIZINE HYDROCHLORIDE 25 MG/1
25 TABLET ORAL
Qty: 30 TAB | Refills: 1 | Status: SHIPPED | OUTPATIENT
Start: 2017-07-24 | End: 2017-08-03

## 2017-07-24 NOTE — PATIENT INSTRUCTIONS
I have put in an order for MRI of your brain. Expect a call from [facility] to set this up. Take meclizine (antivert) up to 3x/day as needed to help with the dizziness. Make sure you are drinking at least 64 oz of water/day. Vertigo: Care Instructions  Your Care Instructions  Vertigo is the feeling that you or your surroundings are moving when there is no actual movement. It is often described as a feeling of spinning, whirling, falling, or tilting. Vertigo may make you vomit or feel nauseated. You may have trouble standing or walking and may lose your balance. Vertigo is often related to an inner ear problem, but it can have other more serious causes. If vertigo continues, you may need more tests to find its cause. Follow-up care is a key part of your treatment and safety. Be sure to make and go to all appointments, and call your doctor if you are having problems. Its also a good idea to know your test results and keep a list of the medicines you take. How can you care for yourself at home? · Do not lie flat on your back. Prop yourself up slightly. This may reduce the spinning feeling. Keep your eyes open. · Move slowly so that you do not fall. · If your doctor recommends medicine, take it exactly as directed. · Do not drive while you are having vertigo. Certain exercises, called Mccrary-Daroff exercises, can help decrease vertigo. To do Mccrary-Daroff exercises:  · Sit on the edge of a bed or sofa and quickly lie down on the side that causes the worst vertigo. Lie on your side with your ear down. · Stay in this position for at least 30 seconds or until the vertigo goes away. · Sit up. If this causes vertigo, wait for it to stop. · Repeat the procedure on the other side. · Repeat this 10 times. Do these exercises 2 times a day until the vertigo is gone. When should you call for help? Call 911 anytime you think you may need emergency care.  For example, call if:  · You passed out (lost consciousness). · You have symptoms of a stroke. These may include:  ¨ Sudden numbness, tingling, weakness, or loss of movement in your face, arm, or leg, especially on only one side of your body. ¨ Sudden vision changes. ¨ Sudden trouble speaking. ¨ Sudden confusion or trouble understanding simple statements. ¨ Sudden problems with walking or balance. ¨ A sudden, severe headache that is different from past headaches. Call your doctor now or seek immediate medical care if:  · Vertigo occurs with a fever, a headache, or ringing in your ears. · You have new or increased nausea and vomiting. Watch closely for changes in your health, and be sure to contact your doctor if:  · Vertigo gets worse or happens more often. · Vertigo has not gotten better after 2 weeks. Where can you learn more? Go to http://paulette-faizan.info/. Enter I180 in the search box to learn more about \"Vertigo: Care Instructions. \"  Current as of: July 29, 2016  Content Version: 11.3  © 9593-9421 [employer]. Care instructions adapted under license by [employer] (which disclaims liability or warranty for this information). If you have questions about a medical condition or this instruction, always ask your healthcare professional. [place] [address] any warranty or liability for your use of this information.

## 2017-07-24 NOTE — PROGRESS NOTES
Chief Complaint   Patient presents with    Hypertension    Thyroid Problem    Dizziness       Dizziness for 2 weeks. Ceci Loose Headaches for 3 days. 1. Have you been to the ER, urgent care clinic since your last visit? Hospitalized since your last visit? No    2. Have you seen or consulted any other health care providers outside of the [facility] since your last visit? Include any pap smears or colon screening.  No

## 2017-07-25 ENCOUNTER — TELEPHONE (OUTPATIENT)
Dept: FAMILY MEDICINE CLINIC | Age: 62
End: 2017-07-25

## 2017-07-25 NOTE — PROGRESS NOTES
Please let her know, as we discussed in appointment, she needs to drink more water, otherwise labs look good.

## 2017-07-25 NOTE — TELEPHONE ENCOUNTER
----- Message from Jacqui Knox NP sent at 7/25/2017 10:18 AM EDT -----  Please let her know, as we discussed in appointment, she needs to drink more water, otherwise labs look good.

## 2017-07-27 ENCOUNTER — TELEPHONE (OUTPATIENT)
Dept: FAMILY MEDICINE CLINIC | Age: 62
End: 2017-07-27

## 2017-07-27 NOTE — PROGRESS NOTES
Chief Complaint   Patient presents with    Hypertension    Dizziness     she is a 65y.o. year old female who presents for evaluation of room spinning dizziness, HTN. Pt with hx of thyroid nodule and mediastinal schwannoma presents with 2 weeks of room spinning dizziness with certain head movements and lying down.  + nausea, headache past 2 days. Denies cp, sob, abd pain, overt blood loss, BBT stools, hematuria, fatigue, seizure like activity, LOC. Reviewed and agree with Nurse Note duplicated in this note. Reviewed PmHx, RxHx, FmHx, SocHx, AllgHx and updated in chart.     Patient Labs were reviewed: yes    Patient Past Records were reviewed:  yes    Review of Systems - negative except as listed above    Objective:     Vitals:    07/24/17 1455 07/24/17 1503 07/24/17 1505   BP: 126/83 116/84 101/78   Pulse: 79 86 91   Resp: 18     Temp: 96.7 °F (35.9 °C)     TempSrc: Oral     SpO2: 98%     Weight: 201 lb (91.2 kg)     Height: 5' 8\" (1.727 m)       Physical Examination: General appearance - alert, well appearing, and in no distress  Mental status - alert, oriented to person, place, and time  Eyes - pupils reactive, extraocular eye movements intact, R pupil > L prior to checking reactivity- baseline for pt  Ears - bilateral TM's and external ear canals normal  Nose - normal and patent, no erythema, discharge or polyps and normal nontender sinuses  Mouth - mucous membranes moist, pharynx normal without lesions  Neck - supple, no significant adenopathy  Chest - clear to auscultation, no wheezes, rales or rhonchi, symmetric air entry  Heart - normal rate, regular rhythm, normal S1, S2, no murmurs, rubs, clicks or gallops  Neurological - alert, oriented, normal speech, no focal findings or movement disorder noted, cranial nerves II through XII intact, DTR's normal and symmetric, normal muscle tone, no tremors, strength 5/5  Extremities - peripheral pulses normal, no pedal edema, no clubbing or cyanosis    Assessment/ Plan:   Diagnoses and all orders for this visit:    1. Dizziness  -     MAGNESIUM; Future    2. Vertigo  -     MRI IAC W WO CONT; Future  -     meclizine (ANTIVERT) 25 mg tablet; Take 1 Tab by mouth three (3) times daily as needed for up to 10 days. 3. Thyroid nodule  -     TSH 3RD GENERATION; Future  -     T4, FREE; Future    4. Essential hypertension with goal blood pressure less than 416/96  -     METABOLIC PANEL, COMPREHENSIVE; Future       Encounter Diagnoses   Name Primary?  Dizziness Yes    Vertigo     Thyroid nodule     Essential hypertension with goal blood pressure less than 140/90      Orders Placed This Encounter    MRI IAC W WO CONT    METABOLIC PANEL, COMPREHENSIVE    MAGNESIUM    TSH 3RD GENERATION    T4, FREE    meclizine (ANTIVERT) 25 mg tablet     Follow-up Disposition:  Return in about 4 weeks (around 8/21/2017). Patient Instructions   I have put in an order for MRI of your brain. Expect a call from [facility] to set this up. Take meclizine (antivert) up to 3x/day as needed to help with the dizziness. Make sure you are drinking at least 64 oz of water/day. Vertigo: Care Instructions  Your Care Instructions  Vertigo is the feeling that you or your surroundings are moving when there is no actual movement. It is often described as a feeling of spinning, whirling, falling, or tilting. Vertigo may make you vomit or feel nauseated. You may have trouble standing or walking and may lose your balance. Vertigo is often related to an inner ear problem, but it can have other more serious causes. If vertigo continues, you may need more tests to find its cause. Follow-up care is a key part of your treatment and safety. Be sure to make and go to all appointments, and call your doctor if you are having problems. Its also a good idea to know your test results and keep a list of the medicines you take. How can you care for yourself at home?   · Do not lie flat on your back. Prop yourself up slightly. This may reduce the spinning feeling. Keep your eyes open. · Move slowly so that you do not fall. · If your doctor recommends medicine, take it exactly as directed. · Do not drive while you are having vertigo. Certain exercises, called Mccrary-Daroff exercises, can help decrease vertigo. To do Mccrary-Daroff exercises:  · Sit on the edge of a bed or sofa and quickly lie down on the side that causes the worst vertigo. Lie on your side with your ear down. · Stay in this position for at least 30 seconds or until the vertigo goes away. · Sit up. If this causes vertigo, wait for it to stop. · Repeat the procedure on the other side. · Repeat this 10 times. Do these exercises 2 times a day until the vertigo is gone. When should you call for help? Call 911 anytime you think you may need emergency care. For example, call if:  · You passed out (lost consciousness). · You have symptoms of a stroke. These may include:  ¨ Sudden numbness, tingling, weakness, or loss of movement in your face, arm, or leg, especially on only one side of your body. ¨ Sudden vision changes. ¨ Sudden trouble speaking. ¨ Sudden confusion or trouble understanding simple statements. ¨ Sudden problems with walking or balance. ¨ A sudden, severe headache that is different from past headaches. Call your doctor now or seek immediate medical care if:  · Vertigo occurs with a fever, a headache, or ringing in your ears. · You have new or increased nausea and vomiting. Watch closely for changes in your health, and be sure to contact your doctor if:  · Vertigo gets worse or happens more often. · Vertigo has not gotten better after 2 weeks. Where can you learn more? Go to http://paulette-faizan.info/. Enter E674 in the search box to learn more about \"Vertigo: Care Instructions. \"  Current as of: July 29, 2016  Content Version: 11.3  © 8933-8194 [facility], [email or web address]. Care instructions adapted under license by [employer] (which disclaims liability or warranty for this information). If you have questions about a medical condition or this instruction, always ask your healthcare professional. [address] any warranty or liability for your use of this information. I have discussed the diagnosis with the patient and the intended plan as seen in the above orders. The patient has received an After-Visit Summary and questions were answered concerning future plans.      Medication Side Effects and Warnings were discussed with patient: yes      LETI Olmos  [facility]

## 2017-08-02 ENCOUNTER — TELEPHONE (OUTPATIENT)
Dept: FAMILY MEDICINE CLINIC | Age: 62
End: 2017-08-02

## 2017-08-02 NOTE — TELEPHONE ENCOUNTER
Luis Alberto Foreman with [facility] medicaiad denied MRI of the brain. States medicaid requesting additional clinical information for pt.

## 2017-08-04 NOTE — TELEPHONE ENCOUNTER
Spoke with Hudson Cosby at [address] [place] [address] and informed her that was the only clinical notes we have on the pt for dizziness. She states pt order will  on today but she will call pt and let her know.

## 2017-08-04 NOTE — TELEPHONE ENCOUNTER
Kiana barrow/ [facility] called again. Thelma Martino is requesting additional information. Pt is scheduled for her MRI on Monday, 8/7/17 @ 12:30.

## 2017-08-18 ENCOUNTER — OFFICE VISIT (OUTPATIENT)
Dept: FAMILY MEDICINE CLINIC | Age: 62
End: 2017-08-18

## 2017-08-18 VITALS
RESPIRATION RATE: 18 BRPM | SYSTOLIC BLOOD PRESSURE: 117 MMHG | BODY MASS INDEX: 31.22 KG/M2 | TEMPERATURE: 97.9 F | DIASTOLIC BLOOD PRESSURE: 87 MMHG | WEIGHT: 206 LBS | HEART RATE: 96 BPM | HEIGHT: 68 IN

## 2017-08-18 DIAGNOSIS — G89.4 CHRONIC PAIN SYNDROME: ICD-10-CM

## 2017-08-18 DIAGNOSIS — R42 VERTIGO: Primary | ICD-10-CM

## 2017-08-18 DIAGNOSIS — D36.10 SCHWANNOMA: ICD-10-CM

## 2017-08-18 NOTE — MR AVS SNAPSHOT
Visit Information Date & Time Provider Department Dept. Phone Encounter #  
 8/18/2017  2:00 PM Reshma Cheryl, LIYA [address] [address] [phone] [id number] Upcoming Health Maintenance Date Due Hepatitis C Screening 1955 Pneumococcal 19-64 Medium Risk (1 of 1 - PPSV23) 4/26/1975 DTaP/Tdap/Td series (1 - Tdap) 4/26/1977 PAP AKA CERVICAL CYTOLOGY 4/26/1977 FOBT Q 1 YEAR AGE 50-75 4/26/2006 ZOSTER VACCINE AGE 60> 2/26/2016 BREAST CANCER SCRN MAMMOGRAM 7/30/2016 INFLUENZA AGE 9 TO ADULT 8/1/2017 Allergies as of 8/18/2017  Review Complete On: 8/18/2017 By: Bertrand Siemens, LPN Severity Noted Reaction Type Reactions Morphine High 07/14/2016    Anaphylaxis Current Immunizations  Reviewed on 12/13/2016 Name Date Influenza Vaccine (Quad) PF 12/13/2016 Not reviewed this visit You Were Diagnosed With   
  
 Codes Comments Vertigo    -  Primary ICD-10-CM: N74 ICD-9-CM: 780.4 Schwannoma     ICD-10-CM: D36.10 ICD-9-CM: 215.9 Chronic pain syndrome     ICD-10-CM: G89.4 ICD-9-CM: 338. 4 Vitals BP Pulse Temp Resp Height(growth percentile) Weight(growth percentile) 117/87 96 97.9 °F (36.6 °C) (Oral) 18 5' 8\" (1.727 m) 206 lb (93.4 kg) BMI OB Status Smoking Status 31.32 kg/m2 Hysterectomy Current Every Day Smoker Vitals History BMI and BSA Data Body Mass Index Body Surface Area  
 31.32 kg/m 2 2.12 m 2 Preferred Pharmacy Pharmacy Name Phone [facility] [address], [address] [facility] [phone] Your Updated Medication List  
  
   
This list is accurate as of: 8/18/17  2:41 PM.  Always use your most recent med list.  
  
  
  
  
 gabapentin 600 mg tablet Commonly known as:  NEURONTIN Take  by mouth three (3) times daily. hydrOXYzine HCl 50 mg tablet Commonly known as:  ATARAX Take 50 mg by mouth three (3) times daily as needed for Itching. lisinopril 5 mg tablet Commonly known as:  Katrin [place] Take 1 Tab by mouth daily. potassium chloride SA 10 mEq capsule Commonly known as:  Franky [place] Take 1 Cap by mouth two (2) times a day. QUEtiapine 50 mg tablet Commonly known as:  SEROquel Take 75 mg by mouth daily. rOPINIRole 0.25 mg tablet Commonly known as:  Yu [place] Take  by mouth three (3) times daily. We Performed the Following REFERRAL TO NEUROLOGY [TUL94 Custom] Comments:  
 Please evaluate patient for vertigo with hx schwannoma. To-Do List   
 08/18/2017 Imaging:  MRI BRAIN W WO CONT Referral Information Referral ID Referred By Referred To  
  
 [id number] Mary BUSTAMANTE Not Available Visits Status Start Date End Date 1 New Request 8/18/17 8/18/18 If your referral has a status of pending review or denied, additional information will be sent to support the outcome of this decision. Referral ID Referred By Referred To  
 [id number] BRANDY SANTILLAN MD  
   [address] [place] [address] Ruth Pedersen  Phone: [phone] Fax: [phone] Visits Status Start Date End Date 1 New Request 8/18/17 8/18/18 If your referral has a status of pending review or denied, additional information will be sent to support the outcome of this decision. Patient Instructions Take hydroxizine up to 3x/day to see if that helps with room spinning symptoms. I have reordered MRI and referred you to Neurology. I'll have the referral checked about pain management. Introducing [facility]! Kinjal Huber introduces [facility] patient portal. Now you can access parts of your medical record, email your doctor's office, and request medication refills online. 1. In your internet browser, go to [email or web address] 2. Click on the First Time User? Click Here link in the Sign In box. You will see the New Member Sign Up page. 3. Enter your Zocere Access Code exactly as it appears below. You will not need to use this code after youve completed the sign-up process. If you do not sign up before the expiration date, you must request a new code. · Zocere Access Code: [email or web address] Expires: 10/22/2017  3:12 PM 
 
4. Enter the last four digits of your Social Security Number (xxxx) and Date of Birth (mm/dd/yyyy) as indicated and click Submit. You will be taken to the next sign-up page. 5. Create a Zocere ID. This will be your Zocere login ID and cannot be changed, so think of one that is secure and easy to remember. 6. Create a Zocere password. You can change your password at any time. 7. Enter your Password Reset Question and Answer. This can be used at a later time if you forget your password. 8. Enter your e-mail address. You will receive e-mail notification when new information is available in [address] 9. Click Sign Up. You can now view and download portions of your medical record. 10. Click the Download Summary menu link to download a portable copy of your medical information. If you have questions, please visit the Frequently Asked Questions section of the Zocere website. Remember, Zocere is NOT to be used for urgent needs. For medical emergencies, dial 911. Now available from your iPhone and Android! Please provide this summary of care documentation to your next provider. Your primary care clinician is listed as Donn Maloney. If you have any questions after today's visit, please call [phone].

## 2017-08-18 NOTE — PROGRESS NOTES
Chief Complaint   Patient presents with    Dizziness     Pt is here today for dizziness. Pt states would she lifts her head back she becomes dizzy. 1. Have you been to the ER, urgent care clinic since your last visit? Hospitalized since your last visit? No    2. Have you seen or consulted any other health care providers outside of the [address] [place] [address] since your last visit? Include any pap smears or colon screening.  No

## 2017-08-18 NOTE — PATIENT INSTRUCTIONS
Take hydroxizine up to 3x/day to see if that helps with room spinning symptoms. I have reordered MRI and referred you to Neurology. I'll have the referral checked about pain management.

## 2017-08-21 NOTE — PROGRESS NOTES
Chief Complaint   Patient presents with    Dizziness     she is a 65y.o. year old female who presents for evaluation of continued dizziness. She states no change in symptoms with antivert. She has not gotten any worse and no new symptoms but symptoms continue the same. Room spinning dizziness worsening with certain head movements. She does have hx of schwannoma, insurance denied MRI. Pt also with continued pain issues, difficulty finding pain management also. Orthostatics in office negative. She denies balance difficulties, speech problems, difficulty swallowing, unilateral weakness. Reviewed and agree with Nurse Note duplicated in this note. Reviewed PmHx, RxHx, FmHx, SocHx, AllgHx and updated in chart.     Patient Labs were reviewed: yes    Patient Past Records were reviewed:  yes    Review of Systems - negative except as listed above    Objective:     Vitals:    08/18/17 1349 08/18/17 1353 08/18/17 1354   BP: 120/83 (!) 118/92 117/87   Pulse: 79 87 96   Resp: 18     Temp: 97.9 °F (36.6 °C)     TempSrc: Oral     Weight: 206 lb (93.4 kg)     Height: 5' 8\" (1.727 m)       Physical Examination: General appearance - alert, well appearing, and in no distress  Mental status - alert, oriented to person, place, and time, depressed mood  Eyes - pupils equal and reactive, extraocular eye movements intact  Ears - bilateral TM's and external ear canals normal  Nose - normal and patent, no erythema, discharge or polyps  Mouth - mucous membranes moist, pharynx normal without lesions  Chest - clear to auscultation, no wheezes, rales or rhonchi, symmetric air entry  Heart - normal rate, regular rhythm, normal S1, S2, no murmurs, rubs, clicks or gallops  Neurological - alert, oriented, normal speech, no focal findings or movement disorder noted, cranial nerves II through XII intact, DTR's normal and symmetric  Extremities - peripheral pulses normal, no pedal edema, no clubbing or cyanosis    Assessment/ Plan: Diagnoses and all orders for this visit:    1. Vertigo  -     MRI BRAIN W WO CONT; Future  -     REFERRAL TO NEUROLOGY    2. Schwannoma  -     MRI BRAIN W WO CONT; Future  -     REFERRAL TO NEUROLOGY    3. Chronic pain syndrome       Encounter Diagnoses   Name Primary?  Vertigo Yes    Schwannoma     Chronic pain syndrome      Orders Placed This Encounter    MRI BRAIN W WO CONT    REFERRAL TO NEUROLOGY     Follow-up Disposition: Not on File  Patient Instructions   Take hydroxizine up to 3x/day to see if that helps with room spinning symptoms. I have reordered MRI and referred you to Neurology. I'll have the referral checked about pain management. I have discussed the diagnosis with the patient and the intended plan as seen in the above orders. The patient has received an After-Visit Summary and questions were answered concerning future plans.      Medication Side Effects and Warnings were discussed with patient: yes      Marcelle Conklin NP-C  [employer]

## 2017-08-25 ENCOUNTER — TELEPHONE (OUTPATIENT)
Dept: FAMILY MEDICINE CLINIC | Age: 62
End: 2017-08-25

## 2017-08-25 DIAGNOSIS — I10 ESSENTIAL HYPERTENSION WITH GOAL BLOOD PRESSURE LESS THAN 140/90: ICD-10-CM

## 2017-08-25 NOTE — TELEPHONE ENCOUNTER
Requested Prescriptions     Pending Prescriptions Disp Refills    lisinopril (PRINIVIL, ZESTRIL) 5 mg tablet 90 Tab 3     Sig: Take 1 Tab by mouth daily.

## 2017-08-28 ENCOUNTER — TELEPHONE (OUTPATIENT)
Dept: FAMILY MEDICINE CLINIC | Age: 62
End: 2017-08-28

## 2017-08-28 RX ORDER — LISINOPRIL 5 MG/1
5 TABLET ORAL DAILY
Qty: 90 TAB | Refills: 3 | Status: SHIPPED | OUTPATIENT
Start: 2017-08-28 | End: 2017-12-31

## 2017-08-28 NOTE — TELEPHONE ENCOUNTER
Pt requesting to speak with Np Tavia phan. States very important. States called Friday but no one returned call.

## 2017-08-30 NOTE — TELEPHONE ENCOUNTER
Jeannie Byrne requesting new clinical notes for pt for MRI order. States please fax to [phone].

## 2017-09-14 DIAGNOSIS — H65.191 OTHER ACUTE NONSUPPURATIVE OTITIS MEDIA OF RIGHT EAR, RECURRENCE NOT SPECIFIED: Primary | ICD-10-CM

## 2017-09-14 RX ORDER — AMOXICILLIN 500 MG/1
500 CAPSULE ORAL 2 TIMES DAILY
Qty: 20 CAP | Refills: 0 | Status: SHIPPED | OUTPATIENT
Start: 2017-09-14 | End: 2017-09-24

## 2017-09-14 NOTE — PROGRESS NOTES
Pt with mastoid effusion on MRI, will treat with abx. Pt has follow up with neuro scheduled next week.

## 2017-09-19 ENCOUNTER — TELEPHONE (OUTPATIENT)
Dept: FAMILY MEDICINE CLINIC | Age: 62
End: 2017-09-19

## 2017-09-25 ENCOUNTER — OFFICE VISIT (OUTPATIENT)
Dept: NEUROLOGY | Age: 62
End: 2017-09-25

## 2017-09-25 VITALS
DIASTOLIC BLOOD PRESSURE: 82 MMHG | RESPIRATION RATE: 12 BRPM | HEIGHT: 68 IN | SYSTOLIC BLOOD PRESSURE: 112 MMHG | WEIGHT: 207 LBS | BODY MASS INDEX: 31.37 KG/M2 | TEMPERATURE: 98.3 F | OXYGEN SATURATION: 97 % | HEART RATE: 87 BPM

## 2017-09-25 DIAGNOSIS — D36.10 SCHWANNOMA: Primary | ICD-10-CM

## 2017-09-25 DIAGNOSIS — H81.12 BENIGN PAROXYSMAL POSITIONAL VERTIGO OF LEFT EAR: ICD-10-CM

## 2017-09-25 RX ORDER — INDOMETHACIN 25 MG/1
50 CAPSULE ORAL 3 TIMES DAILY
COMMUNITY
End: 2017-10-10 | Stop reason: SDUPTHER

## 2017-09-25 RX ORDER — MECLIZINE HYDROCHLORIDE 25 MG/1
TABLET ORAL
COMMUNITY
End: 2017-09-25 | Stop reason: ALTCHOICE

## 2017-09-25 RX ORDER — SCOLOPAMINE TRANSDERMAL SYSTEM 1 MG/1
1 PATCH, EXTENDED RELEASE TRANSDERMAL
Qty: 4 PATCH | Refills: 5 | Status: SHIPPED | OUTPATIENT
Start: 2017-09-25 | End: 2018-08-30

## 2017-09-25 NOTE — PROGRESS NOTES
Desmond Aguilera is a 65 y.o., right handed female, with an established history of hypertension, restless legs syndrome who comes in with new onset of vertigo. This started about 2 months ago. She just woke up the sense of spinning of the room about her. The room could spin in either  Direction. She also felt as if she was off balance when she was walking. The sense of vertigo seemed to get worse with any type of movement. This was not too severe when she was sitting still. There was no weakness or numbness of the extremities. She noted blurring of her vision associated with the onset of vertigo. No lose of vision. She also noted onset of stammering starting about 2 years ago. There's no language difficulty. She denies any hearing lose or ringing in the ears. She tried Meclizine for the vertigo which was ineffective. 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 The pathology was benign. The patient had epilepsy as a child. Her last seizure was age 13. Social History; she's single, lives with her mother. She smokes 1 pack of cigarettes every 3 days. Occasional alcohol, no illicit drugs. Was a  for a [employer]. Family History; mother has arthritis, hypertension. Father had Lewy body dementia and heart disease. Had 5 siblings, they have arthritis, depression. Brothers  from heart attack and HIV. Current Outpatient Prescriptions   Medication Sig Dispense Refill    indomethacin (INDOCIN) 25 mg capsule Take 50 mg by mouth three (3) times daily.  meclizine (ANTIVERT) 25 mg tablet Take  by mouth three (3) times daily as needed.  lisinopril (PRINIVIL, ZESTRIL) 5 mg tablet Take 1 Tab by mouth daily. 90 Tab 3    potassium chloride SA (MICRO-K) 10 mEq capsule Take 1 Cap by mouth two (2) times a day. 60 Cap 2    hydrOXYzine (ATARAX) 50 mg tablet Take 50 mg by mouth three (3) times daily as needed for Itching.       QUEtiapine (SEROQUEL) 50 mg tablet Take 400 mg by mouth nightly.  gabapentin (NEURONTIN) 600 mg tablet Take 800 mg by mouth three (3) times daily.  rOPINIRole (REQUIP) 0.25 mg tablet Take 0.5 mg by mouth nightly. Past Medical History:   Diagnosis Date    Arthritis     Chronic kidney disease     Depression     Hypertension     Seizures ([facility] [address]        No past surgical history on file. Allergies   Allergen Reactions    Morphine Anaphylaxis       Patient Active Problem List   Diagnosis Code    Renal calculi N20.0    Essential hypertension with goal blood pressure less than 140/90 I10    Seizure disorder ([facility] [address] G40.909    Recurrent major depressive disorder, in partial remission ([facility] [address] F33.41    Thyroid nodule E04.1    Schwannoma D36.10    Chronic pain syndrome G89.4         Review of Systems:   As above otherwise 11 point review of systems negative including;   Constitutional no fever or chills  Skin denies rash or itching  HENT  Denies tinnitus, hearing lose  Eyes denies diplopia vision lose  Respiratory denies shortness of breath  Cardiovascular denies chest pain, dyspnea on exertion  Gastrointestinal denies nausea, vomiting, diarrhea, constipation  Genitourinary denies incontinence  Musculoskeletal denies joint pain or swelling  Endocrine denies weight change  Hematology denies easy bruising or bleeding   Neurological as above in HPI      PHYSICAL EXAMINATION:      VITAL SIGNS:    Visit Vitals    /82 (BP 1 Location: Right arm, BP Patient Position: Sitting)    Pulse 87    Temp 98.3 °F (36.8 °C) (Oral)    Resp 12    Ht 5' 8\" (1.727 m)    Wt 93.9 kg (207 lb)    SpO2 97%    BMI 31.47 kg/m2       GENERAL: The patient is well developed, well nourished, and in no apparent distress. EXTREMITIES: No clubbing, cyanosis, or edema is identified. Pulses 2+ and symmetrical.  Muscle tone is normal.  HEAD:   Ear, nose, and throat appear to be without trauma.   The patient is normocephalic. NEUROLOGIC EXAMINATION    MENTAL STATUS: The patient is awake, alert, and oriented x 4. Fund of knowledge is adequate. Speech is fluent and memory appears to be intact, both long and short term. CRANIAL NERVES: II - Visual fields are full to confrontation. Funduscopic examination reveals flat disks bilaterally. Pupils are anisocoric, the right is 5 mm and the left is 2 mm. The right is reactive, the left doesn't react. She has left sided ptosis. III, IV, VI - Extraocular movements are intact and there is no nystagmus. V - Facial sensation is intact to pinprick and light touch. VII - Face is symmetrical.   VIII - Hearing is present. IX, X, XII- Palate rises symmetrically. Gag is present. Tongue is in the midline. XI - Shoulder shrugging and head turning intact  MOTOR:  The patient is 5/5 in all four limbs without any drift. Fine finger movements are symmetrical.  Isolated motor group testing reveals no focal abnormalities. Tone is normal.  Sensory examination is intact to pinprick, light touch and position sense testing. Reflexes are 2+ and symmetrical. Plantars are down going. Cerebellar examination reveals no gross ataxia or dysmetria. Gait is normal and the patient can tandem walk without any difficulty. She staggers to the left a little. INDICATION: vertigo with hx of schwannoma. , History of schwannoma, vertigo  COMPARISON: None. TECHNIQUE: Multiplanar, multisequence MRI brain and IACs without and with IV  contrast.     FINDINGS:     IACs:   There is an enhancing lesion in the left CP angle cistern measuring 1.5 cm seen  on image 6 series 11. Lesion does not extend into the IAC. Main differential  considerations would include meningioma versus schwannoma. Meningioma is favored  given dural tails and location of the lesion. Dural metastasis and lymphoma are  less likely differential considerations.  No other areas of signal abnormality or  enhancement involving the brainstem, CP angle cisterns, IACs, or labyrinths. Moderate fluid in the right mastoid air cells.     Brain:  Brain volume is within normal limits for age. There are a few scattered foci of  chronic small vessel ischemic changes, not unusual for patient age. Elsewhere,  there is no brain parenchymal mass, hemorrhage, acute ischemia, or  hydrocephalus. The sella, pineal region, and craniocervical junction are  unremarkable. Skull base flow voids are patent. Postcontrast images demonstrate  no areas of abnormal brain parenchymal enhancement.         IMPRESSION  IMPRESSION:     1.  Enhancing lesion in the left CP angle cistern as described above. Main  differential considerations would include meningioma versus schwannoma. Dural  metastasis and lymphoma are less likely differential considerations. Surveillance or further evaluation is recommended.     2. Moderate right mastoid effusion.     3. Chronic age-related changes. I have reviewed the above imagines myself. CBC:   Lab Results   Component Value Date/Time    WBC 5.8 07/30/2014 09:16 AM    RBC 4.29 07/30/2014 09:16 AM    HGB 12.0 09/30/2014 02:04 PM    HCT 35 09/30/2014 02:04 PM    PLATELET 896 60/14/9359 09:16 AM     BMP:   Lab Results   Component Value Date/Time    Glucose 77 07/24/2017 03:56 PM    Sodium 143 07/24/2017 03:56 PM    Potassium 4.6 07/24/2017 03:56 PM    Chloride 108 07/24/2017 03:56 PM    CO2 27 07/24/2017 03:56 PM    BUN 23 07/24/2017 03:56 PM    Creatinine 0.80 07/24/2017 03:56 PM    Calcium 9.5 07/24/2017 03:56 PM     CMP:   Lab Results   Component Value Date/Time    Glucose 77 07/24/2017 03:56 PM    Sodium 143 07/24/2017 03:56 PM    Potassium 4.6 07/24/2017 03:56 PM    Chloride 108 07/24/2017 03:56 PM    CO2 27 07/24/2017 03:56 PM    BUN 23 07/24/2017 03:56 PM    Creatinine 0.80 07/24/2017 03:56 PM    Calcium 9.5 07/24/2017 03:56 PM    Anion gap 8 07/24/2017 03:56 PM    BUN/Creatinine ratio 29 07/24/2017 03:56 PM    Alk. phosphatase 63 07/24/2017 03:56 PM    Protein, total 6.8 07/24/2017 03:56 PM    Albumin 3.7 07/24/2017 03:56 PM    Globulin 3.1 07/24/2017 03:56 PM    A-G Ratio 1.2 07/24/2017 03:56 PM     Coagulation:   Lab Results   Component Value Date/Time    Prothrombin time 11.1 08/14/2014 07:57 AM    INR 0.9 08/14/2014 07:57 AM     Cardiac markers: No results found for: CPK, CKND1, MISHA       Impression: Vertigo in this patient  who has risk factors including left sided cerebellar pontine angle tumor. This tumor appears to be an incidental finding in that the lesion is not near the internal auditory canal or the 8th cranial nerve. I doubt it's causing her current pathology, but it must be followed. Plan: A repeat MRI scan of the brain will be obtained in 6 months. I'm going to refer her for a neurosurgical evaluation. I'm going to add a transderm scopolamine for the vertigo. Return here in about 6 weeks.

## 2017-09-25 NOTE — MR AVS SNAPSHOT
Visit Information Date & Time Provider Department Dept. Phone Encounter #  
 9/25/2017  3:00 PM Estela Duran MD [address] [phone] [id number] Follow-up Instructions Return in about 6 weeks (around 11/6/2017). Follow-up and Disposition History Your Appointments 11/8/2017  2:40 PM  
Follow Up with Estela Duran MD  
[address] [facility]) Appt Note: 6wk f/u  
 [address] [address]  
[phone]  
  
   
 [facility] [address] Upcoming Health Maintenance Date Due Hepatitis C Screening 1955 Pneumococcal 19-64 Medium Risk (1 of 1 - PPSV23) 4/26/1975 DTaP/Tdap/Td series (1 - Tdap) 4/26/1977 PAP AKA CERVICAL CYTOLOGY 4/26/1977 FOBT Q 1 YEAR AGE 50-75 4/26/2006 ZOSTER VACCINE AGE 60> 2/26/2016 BREAST CANCER SCRN MAMMOGRAM 7/30/2016 INFLUENZA AGE 9 TO ADULT 8/1/2017 Allergies as of 9/25/2017  Review Complete On: 9/25/2017 By: Amador Winters LPN Severity Noted Reaction Type Reactions Morphine High 07/14/2016    Anaphylaxis Current Immunizations  Reviewed on 12/13/2016 Name Date Influenza Vaccine (Quad) PF 12/13/2016 Not reviewed this visit You Were Diagnosed With   
  
 Codes Comments Schwannoma    -  Primary ICD-10-CM: D36.10 ICD-9-CM: 215.9 Benign paroxysmal positional vertigo of left ear     ICD-10-CM: H81.12 
ICD-9-CM: 386.11 Vitals BP Pulse Temp Resp Height(growth percentile) Weight(growth percentile) 112/82 (BP 1 Location: Right arm, BP Patient Position: Sitting) 87 98.3 °F (36.8 °C) (Oral) 12 5' 8\" (1.727 m) 207 lb (93.9 kg) SpO2 BMI OB Status Smoking Status 97% 31.47 kg/m2 Hysterectomy Current Every Day Smoker Vitals History BMI and BSA Data Body Mass Index Body Surface Area  
 31.47 kg/m 2 2.12 m 2 Preferred Pharmacy Pharmacy Name Phone [facility] [address], [address] [place] [phone] Your Updated Medication List  
  
   
This list is accurate as of: 17  3:55 PM.  Always use your most recent med list.  
  
  
  
  
 gabapentin 600 mg tablet Commonly known as:  NEURONTIN Take 800 mg by mouth three (3) times daily. hydrOXYzine HCl 50 mg tablet Commonly known as:  ATARAX Take 50 mg by mouth three (3) times daily as needed for Itching. indomethacin 25 mg capsule Commonly known as:  INDOCIN Take 50 mg by mouth three (3) times daily. lisinopril 5 mg tablet Commonly known as:  Burma Fairy Take 1 Tab by mouth daily. potassium chloride SA 10 mEq capsule Commonly known as:  Arvell Yaa Take 1 Cap by mouth two (2) times a day. QUEtiapine 50 mg tablet Commonly known as:  SEROquel Take 400 mg by mouth nightly. rOPINIRole 0.25 mg tablet Commonly known as:  Fritzi Aase Take 0.5 mg by mouth nightly.  
  
 scopolamine 1.5 mg (1 mg over 3 days) Pt3d Commonly known as:  TRANSDERM-SCOP  
1 Patch by TransDERmal route every seventy-two (72) hours. Prescriptions Printed Refills  
 scopolamine (TRANSDERM-SCOP) 1.5 mg (1 mg over 3 days) pt3d 5 Si Patch by TransDERmal route every seventy-two (72) hours. Class: Print Route: TransDERmal  
  
We Performed the Following REFERRAL TO NEUROSURGERY [PCL54 Custom] Comments:  
 Please evaluate patient for Left CP angle tumor. Follow-up Instructions Return in about 6 weeks (around 2017). To-Do List   
 2018 Imaging:  MRI IAC W WO CONT Referral Information Referral ID Referred By Referred To  
  
 [id number] [facility], [address] [facility] [address] [facility], [address] [place]in Phone: [phone] Fax: [phone] Visits Status Start Date End Date 1 New Request 9/25/17 9/25/18 If your referral has a status of pending review or denied, additional information will be sent to support the outcome of this decision. Introducing [address]! Howard Huerta introduces [employer] patient portal. Now you can access parts of your medical record, email your doctor's office, and request medication refills online. 1. In your internet browser, go to [email or web address] 2. Click on the First Time User? Click Here link in the Sign In box. You will see the New Member Sign Up page. 3. Enter your [employer] Access Code exactly as it appears below. You will not need to use this code after youve completed the sign-up process. If you do not sign up before the expiration date, you must request a new code. · [employer] Access Code: [email or web address] Expires: 10/22/2017  3:12 PM 
 
4. Enter the last four digits of your Social Security Number (xxxx) and Date of Birth (mm/dd/yyyy) as indicated and click Submit. You will be taken to the next sign-up page. 5. Create a [employer] ID. This will be your [employer] login ID and cannot be changed, so think of one that is secure and easy to remember. 6. Create a [employer] password. You can change your password at any time. 7. Enter your Password Reset Question and Answer. This can be used at a later time if you forget your password. 8. Enter your e-mail address. You will receive e-mail notification when new information is available in [address] 9. Click Sign Up. You can now view and download portions of your medical record. 10. Click the Download Summary menu link to download a portable copy of your medical information. If you have questions, please visit the Frequently Asked Questions section of the [employer] website. Remember, [employer] is NOT to be used for urgent needs. For medical emergencies, dial 911. Now available from your iPhone and Android! Please provide this summary of care documentation to your next provider. Your primary care clinician is listed as Kamari Day. If you have any questions after today's visit, please call [phone].

## 2017-09-25 NOTE — LETTER
2017 3:43 PM 
 
Patient:  Sarai Wilks YOB: 1955 Date of Visit: 2017 Dear Leta Sanabria NP 
[address] [facility] [address] [place] [address] VIA In Basket 
 : Thank you for referring Ms. Sarai Wilks to me for evaluation/treatment. Below are the relevant portions of my assessment and plan of care. Sarai Wilks is a 65 y.o., right handed female, with an established history of hypertension, restless legs syndrome who comes in with new onset of vertigo. This started about 2 months ago. She just woke up the sense of spinning of the room about her. The room could spin in either  Direction. She also felt as if she was off balance when she was walking. The sense of vertigo seemed to get worse with any type of movement. This was not too severe when she was sitting still. There was no weakness or numbness of the extremities. She noted blurring of her vision associated with the onset of vertigo. No lose of vision. She also noted onset of stammering starting about 2 years ago. There's no language difficulty. She denies any hearing lose or ringing in the ears. She tried Meclizine for the vertigo which was ineffective. 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 The pathology was benign. The patient had epilepsy as a child. Her last seizure was age 13. Social History; she's single, lives with her mother. She smokes 1 pack of cigarettes every 3 days. Occasional alcohol, no illicit drugs. Was a  for a [employer]. Family History; mother has arthritis, hypertension. Father had Lewy body dementia and heart disease. Had 5 siblings, they have arthritis, depression. Brothers  from heart attack and HIV. Current Outpatient Prescriptions Medication Sig Dispense Refill  indomethacin (INDOCIN) 25 mg capsule Take 50 mg by mouth three (3) times daily.  meclizine (ANTIVERT) 25 mg tablet Take  by mouth three (3) times daily as needed.  lisinopril (PRINIVIL, ZESTRIL) 5 mg tablet Take 1 Tab by mouth daily. 90 Tab 3  potassium chloride SA (MICRO-K) 10 mEq capsule Take 1 Cap by mouth two (2) times a day. 60 Cap 2  
 hydrOXYzine (ATARAX) 50 mg tablet Take 50 mg by mouth three (3) times daily as needed for Itching.  QUEtiapine (SEROQUEL) 50 mg tablet Take 400 mg by mouth nightly.  gabapentin (NEURONTIN) 600 mg tablet Take 800 mg by mouth three (3) times daily.  rOPINIRole (REQUIP) 0.25 mg tablet Take 0.5 mg by mouth nightly. Past Medical History:  
Diagnosis Date  Arthritis  Chronic kidney disease  Depression  Hypertension  Seizures ([facility] [address] No past surgical history on file. Allergies Allergen Reactions  Morphine Anaphylaxis Patient Active Problem List  
Diagnosis Code  Renal calculi N20.0  Essential hypertension with goal blood pressure less than 140/90 I10  Seizure disorder ([facility] [address] G40.909  Recurrent major depressive disorder, in partial remission ([facility] [address] F33.41  Thyroid nodule E04.1  Schwannoma D36.10  Chronic pain syndrome G89.4 Review of Systems: As above otherwise 11 point review of systems negative including;  
Constitutional no fever or chills Skin denies rash or itching HENT  Denies tinnitus, hearing lose Eyes denies diplopia vision lose Respiratory denies shortness of breath Cardiovascular denies chest pain, dyspnea on exertion Gastrointestinal denies nausea, vomiting, diarrhea, constipation Genitourinary denies incontinence Musculoskeletal denies joint pain or swelling Endocrine denies weight change Hematology denies easy bruising or bleeding Neurological as above in HPI PHYSICAL EXAMINATION:   
 
VITAL SIGNS:   
Visit Vitals  /82 (BP 1 Location: Right arm, BP Patient Position: Sitting)  Pulse 87  Temp 98.3 °F (36.8 °C) (Oral)  Resp 12  Ht 5' 8\" (1.727 m)  Wt 93.9 kg (207 lb)  SpO2 97%  BMI 31.47 kg/m2 GENERAL: The patient is well developed, well nourished, and in no apparent distress. EXTREMITIES: No clubbing, cyanosis, or edema is identified. Pulses 2+ and symmetrical.  Muscle tone is normal. 
HEAD:   Ear, nose, and throat appear to be without trauma. The patient is normocephalic. NEUROLOGIC EXAMINATION 
 
MENTAL STATUS: The patient is awake, alert, and oriented x 4. Fund of knowledge is adequate. Speech is fluent and memory appears to be intact, both long and short term. CRANIAL NERVES: II  Visual fields are full to confrontation. Funduscopic examination reveals flat disks bilaterally. Pupils are anisocoric, the right is 5 mm and the left is 2 mm. The right is reactive, the left doesn't react. She has left sided ptosis. III, IV, VI  Extraocular movements are intact and there is no nystagmus. V  Facial sensation is intact to pinprick and light touch. VII  Face is symmetrical.  
VIII - Hearing is present. IX, X, [address] rises symmetrically. Gag is present. Tongue is in the midline. XI - Shoulder shrugging and head turning intact MOTOR:  The patient is 5/5 in all four limbs without any drift. Fine finger movements are symmetrical.  Isolated motor group testing reveals no focal abnormalities. Tone is normal.  Sensory examination is intact to pinprick, light touch and position sense testing. Reflexes are 2+ and symmetrical. Plantars are down going. Cerebellar examination reveals no gross ataxia or dysmetria. Gait is normal and the patient can tandem walk without any difficulty. She staggers to the left a little. INDICATION: vertigo with hx of schwannoma. , History of schwannoma, vertigo COMPARISON: None.  
TECHNIQUE: Multiplanar, multisequence MRI brain and IACs without and with IV 
contrast. 
  
FINDINGS: 
  
IACs:  
 There is an enhancing lesion in the left CP angle cistern measuring 1.5 cm seen 
on image 6 series 11. Lesion does not extend into the IAC. Main differential 
considerations would include meningioma versus schwannoma. Meningioma is favored 
given dural tails and location of the lesion. Dural metastasis and lymphoma are 
less likely differential considerations. No other areas of signal abnormality or 
enhancement involving the brainstem, CP angle cisterns, IACs, or labyrinths. Moderate fluid in the right mastoid air cells. 
  
Brain: 
Brain volume is within normal limits for age. There are a few scattered foci of 
chronic small vessel ischemic changes, not unusual for patient age. Elsewhere, 
there is no brain parenchymal mass, hemorrhage, acute ischemia, or 
hydrocephalus. The sella, pineal region, and craniocervical junction are 
unremarkable. Skull base flow voids are patent. Postcontrast images demonstrate 
no areas of abnormal brain parenchymal enhancement.     
  
IMPRESSION IMPRESSION: 
  
1.  Enhancing lesion in the left CP angle cistern as described above. Main 
differential considerations would include meningioma versus schwannoma. Dural 
metastasis and lymphoma are less likely differential considerations. Surveillance or further evaluation is recommended. 
  
2. Moderate right mastoid effusion. 
  
3. Chronic age-related changes. I have reviewed the above imagines myself. CBC:  
Lab Results Component Value Date/Time WBC 5.8 07/30/2014 09:16 AM  
 RBC 4.29 07/30/2014 09:16 AM  
 HGB 12.0 09/30/2014 02:04 PM  
 HCT 35 09/30/2014 02:04 PM  
 PLATELET 680 12/66/2235 09:16 AM  
 
BMP:  
Lab Results Component Value Date/Time  Glucose 77 07/24/2017 03:56 PM  
 Sodium 143 07/24/2017 03:56 PM  
 Potassium 4.6 07/24/2017 03:56 PM  
 Chloride 108 07/24/2017 03:56 PM  
 CO2 27 07/24/2017 03:56 PM  
 BUN 23 07/24/2017 03:56 PM  
 Creatinine 0.80 07/24/2017 03:56 PM  
 Calcium 9.5 07/24/2017 03:56 PM  
 
CMP:  
Lab Results Component Value Date/Time Glucose 77 07/24/2017 03:56 PM  
 Sodium 143 07/24/2017 03:56 PM  
 Potassium 4.6 07/24/2017 03:56 PM  
 Chloride 108 07/24/2017 03:56 PM  
 CO2 27 07/24/2017 03:56 PM  
 BUN 23 07/24/2017 03:56 PM  
 Creatinine 0.80 07/24/2017 03:56 PM  
 Calcium 9.5 07/24/2017 03:56 PM  
 Anion gap 8 07/24/2017 03:56 PM  
 BUN/Creatinine ratio 29 07/24/2017 03:56 PM  
 Alk. phosphatase 63 07/24/2017 03:56 PM  
 Protein, total 6.8 07/24/2017 03:56 PM  
 Albumin 3.7 07/24/2017 03:56 PM  
 Globulin 3.1 07/24/2017 03:56 PM  
 A-G Ratio 1.2 07/24/2017 03:56 PM  
 
Coagulation:  
Lab Results Component Value Date/Time Prothrombin time 11.1 08/14/2014 07:57 AM  
 INR 0.9 08/14/2014 07:57 AM  
 
Cardiac markers: No results found for: CPK, CKND1, MISHA Impression: Vertigo in this patient  who has risk factors including left sided cerebellar pontine angle tumor. This tumor appears to be an incidental finding in that the lesion is not near the internal auditory canal or the 8th cranial nerve. I doubt it's causing her current pathology, but it must be followed. Plan: A repeat MRI scan of the brain will be obtained in 6 months. I'm going to refer her for a neurosurgical evaluation. I'm going to add a transderm scopolamine for the vertigo. Return here in about 6 weeks. If you have questions, please do not hesitate to call me. I look forward to following Ms. Aleman along with you.  
 
 
 
Sincerely, 
 
 
Nely Brantley MD

## 2017-09-25 NOTE — COMMUNICATION BODY
Desmond Aguilera is a 65 y.o., right handed female, with an established history of hypertension, restless legs syndrome who comes in with new onset of vertigo. This started about 2 months ago. She just woke up the sense of spinning of the room about her. The room could spin in either  Direction. She also felt as if she was off balance when she was walking. The sense of vertigo seemed to get worse with any type of movement. This was not too severe when she was sitting still. There was no weakness or numbness of the extremities. She noted blurring of her vision associated with the onset of vertigo. No lose of vision. She also noted onset of stammering starting about 2 years ago. There's no language difficulty. She denies any hearing lose or ringing in the ears. She tried Meclizine for the vertigo which was ineffective. 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 The pathology was benign. The patient had epilepsy as a child. Her last seizure was age 13. Social History; she's single, lives with her mother. She smokes 1 pack of cigarettes every 3 days. Occasional alcohol, no illicit drugs. Was a  for a [employer]. Family History; mother has arthritis, hypertension. Father had Lewy body dementia and heart disease. Had 5 siblings, they have arthritis, depression. Brothers  from heart attack and HIV. Current Outpatient Prescriptions   Medication Sig Dispense Refill    indomethacin (INDOCIN) 25 mg capsule Take 50 mg by mouth three (3) times daily.  meclizine (ANTIVERT) 25 mg tablet Take  by mouth three (3) times daily as needed.  lisinopril (PRINIVIL, ZESTRIL) 5 mg tablet Take 1 Tab by mouth daily. 90 Tab 3    potassium chloride SA (MICRO-K) 10 mEq capsule Take 1 Cap by mouth two (2) times a day. 60 Cap 2    hydrOXYzine (ATARAX) 50 mg tablet Take 50 mg by mouth three (3) times daily as needed for Itching.       QUEtiapine (SEROQUEL) 50 mg tablet Take 400 mg by mouth nightly.  gabapentin (NEURONTIN) 600 mg tablet Take 800 mg by mouth three (3) times daily.  rOPINIRole (REQUIP) 0.25 mg tablet Take 0.5 mg by mouth nightly. Past Medical History:   Diagnosis Date    Arthritis     Chronic kidney disease     Depression     Hypertension     Seizures ([facility] [address]        No past surgical history on file. Allergies   Allergen Reactions    Morphine Anaphylaxis       Patient Active Problem List   Diagnosis Code    Renal calculi N20.0    Essential hypertension with goal blood pressure less than 140/90 I10    Seizure disorder ([facility] [address] G40.909    Recurrent major depressive disorder, in partial remission ([facility] [address] F33.41    Thyroid nodule E04.1    Schwannoma D36.10    Chronic pain syndrome G89.4         Review of Systems:   As above otherwise 11 point review of systems negative including;   Constitutional no fever or chills  Skin denies rash or itching  HENT  Denies tinnitus, hearing lose  Eyes denies diplopia vision lose  Respiratory denies shortness of breath  Cardiovascular denies chest pain, dyspnea on exertion  Gastrointestinal denies nausea, vomiting, diarrhea, constipation  Genitourinary denies incontinence  Musculoskeletal denies joint pain or swelling  Endocrine denies weight change  Hematology denies easy bruising or bleeding   Neurological as above in HPI      PHYSICAL EXAMINATION:      VITAL SIGNS:    Visit Vitals    /82 (BP 1 Location: Right arm, BP Patient Position: Sitting)    Pulse 87    Temp 98.3 °F (36.8 °C) (Oral)    Resp 12    Ht 5' 8\" (1.727 m)    Wt 93.9 kg (207 lb)    SpO2 97%    BMI 31.47 kg/m2       GENERAL: The patient is well developed, well nourished, and in no apparent distress. EXTREMITIES: No clubbing, cyanosis, or edema is identified. Pulses 2+ and symmetrical.  Muscle tone is normal.  HEAD:   Ear, nose, and throat appear to be without trauma.   The patient is normocephalic. NEUROLOGIC EXAMINATION    MENTAL STATUS: The patient is awake, alert, and oriented x 4. Fund of knowledge is adequate. Speech is fluent and memory appears to be intact, both long and short term. CRANIAL NERVES: II - Visual fields are full to confrontation. Funduscopic examination reveals flat disks bilaterally. Pupils are anisocoric, the right is 5 mm and the left is 2 mm. The right is reactive, the left doesn't react. She has left sided ptosis. III, IV, VI - Extraocular movements are intact and there is no nystagmus. V - Facial sensation is intact to pinprick and light touch. VII - Face is symmetrical.   VIII - Hearing is present. IX, X, XII- Palate rises symmetrically. Gag is present. Tongue is in the midline. XI - Shoulder shrugging and head turning intact  MOTOR:  The patient is 5/5 in all four limbs without any drift. Fine finger movements are symmetrical.  Isolated motor group testing reveals no focal abnormalities. Tone is normal.  Sensory examination is intact to pinprick, light touch and position sense testing. Reflexes are 2+ and symmetrical. Plantars are down going. Cerebellar examination reveals no gross ataxia or dysmetria. Gait is normal and the patient can tandem walk without any difficulty. She staggers to the left a little. INDICATION: vertigo with hx of schwannoma. , History of schwannoma, vertigo  COMPARISON: None. TECHNIQUE: Multiplanar, multisequence MRI brain and IACs without and with IV  contrast.     FINDINGS:     IACs:   There is an enhancing lesion in the left CP angle cistern measuring 1.5 cm seen  on image 6 series 11. Lesion does not extend into the IAC. Main differential  considerations would include meningioma versus schwannoma. Meningioma is favored  given dural tails and location of the lesion. Dural metastasis and lymphoma are  less likely differential considerations.  No other areas of signal abnormality or  enhancement involving the brainstem, CP angle cisterns, IACs, or labyrinths. Moderate fluid in the right mastoid air cells.     Brain:  Brain volume is within normal limits for age. There are a few scattered foci of  chronic small vessel ischemic changes, not unusual for patient age. Elsewhere,  there is no brain parenchymal mass, hemorrhage, acute ischemia, or  hydrocephalus. The sella, pineal region, and craniocervical junction are  unremarkable. Skull base flow voids are patent. Postcontrast images demonstrate  no areas of abnormal brain parenchymal enhancement.         IMPRESSION  IMPRESSION:     1.  Enhancing lesion in the left CP angle cistern as described above. Main  differential considerations would include meningioma versus schwannoma. Dural  metastasis and lymphoma are less likely differential considerations. Surveillance or further evaluation is recommended.     2. Moderate right mastoid effusion.     3. Chronic age-related changes. I have reviewed the above imagines myself. CBC:   Lab Results   Component Value Date/Time    WBC 5.8 07/30/2014 09:16 AM    RBC 4.29 07/30/2014 09:16 AM    HGB 12.0 09/30/2014 02:04 PM    HCT 35 09/30/2014 02:04 PM    PLATELET 822 77/33/4012 09:16 AM     BMP:   Lab Results   Component Value Date/Time    Glucose 77 07/24/2017 03:56 PM    Sodium 143 07/24/2017 03:56 PM    Potassium 4.6 07/24/2017 03:56 PM    Chloride 108 07/24/2017 03:56 PM    CO2 27 07/24/2017 03:56 PM    BUN 23 07/24/2017 03:56 PM    Creatinine 0.80 07/24/2017 03:56 PM    Calcium 9.5 07/24/2017 03:56 PM     CMP:   Lab Results   Component Value Date/Time    Glucose 77 07/24/2017 03:56 PM    Sodium 143 07/24/2017 03:56 PM    Potassium 4.6 07/24/2017 03:56 PM    Chloride 108 07/24/2017 03:56 PM    CO2 27 07/24/2017 03:56 PM    BUN 23 07/24/2017 03:56 PM    Creatinine 0.80 07/24/2017 03:56 PM    Calcium 9.5 07/24/2017 03:56 PM    Anion gap 8 07/24/2017 03:56 PM    BUN/Creatinine ratio 29 07/24/2017 03:56 PM    Alk. phosphatase 63 07/24/2017 03:56 PM    Protein, total 6.8 07/24/2017 03:56 PM    Albumin 3.7 07/24/2017 03:56 PM    Globulin 3.1 07/24/2017 03:56 PM    A-G Ratio 1.2 07/24/2017 03:56 PM     Coagulation:   Lab Results   Component Value Date/Time    Prothrombin time 11.1 08/14/2014 07:57 AM    INR 0.9 08/14/2014 07:57 AM     Cardiac markers: No results found for: CPK, CKND1, MISHA       Impression: Vertigo in this patient  who has risk factors including left sided cerebellar pontine angle tumor. This tumor appears to be an incidental finding in that the lesion is not near the internal auditory canal or the 8th cranial nerve. I doubt it's causing her current pathology, but it must be followed. Plan: A repeat MRI scan of the brain will be obtained in 6 months. I'm going to refer her for a neurosurgical evaluation. I'm going to add a transderm scopolamine for the vertigo. Return here in about 6 weeks.

## 2017-10-03 ENCOUNTER — TELEPHONE (OUTPATIENT)
Dept: NEUROLOGY | Age: 62
End: 2017-10-03

## 2017-10-10 RX ORDER — POTASSIUM CHLORIDE 750 MG/1
10 CAPSULE, EXTENDED RELEASE ORAL 2 TIMES DAILY
Qty: 60 CAP | Refills: 2 | Status: SHIPPED | OUTPATIENT
Start: 2017-10-10 | End: 2018-07-25 | Stop reason: SDUPTHER

## 2017-10-10 RX ORDER — INDOMETHACIN 25 MG/1
50 CAPSULE ORAL 3 TIMES DAILY
Qty: 90 CAP | Refills: 1 | Status: SHIPPED | OUTPATIENT
Start: 2017-10-10 | End: 2017-12-31

## 2017-10-10 NOTE — TELEPHONE ENCOUNTER
Requested Prescriptions     Pending Prescriptions Disp Refills    potassium chloride SA (MICRO-K) 10 mEq capsule 60 Cap 2     Sig: Take 1 Cap by mouth two (2) times a day.  indomethacin (INDOCIN) 25 mg capsule       Sig: Take 2 Caps by mouth three (3) times daily.

## 2017-10-31 ENCOUNTER — TELEPHONE (OUTPATIENT)
Dept: FAMILY MEDICINE CLINIC | Age: 62
End: 2017-10-31

## 2017-10-31 NOTE — TELEPHONE ENCOUNTER
Pt called returning Ben's call. Pt exclaimed she did not know what it is in regard to. Ramona Knight please return pt call.

## 2017-11-01 NOTE — TELEPHONE ENCOUNTER
Lmom to inform patient that we last spoke in September in regards to MRI results and to discuss future findings with . Patient has seen Sejal Greenwood since spoken with patient In the matter. I have no other information to give patient at this time. Lmom to inform patient if she has any other questions she can call us back.

## 2017-12-27 ENCOUNTER — HOSPITAL ENCOUNTER (INPATIENT)
Age: 62
LOS: 4 days | Discharge: HOME | DRG: 885 | End: 2017-12-31
Attending: PSYCHIATRY & NEUROLOGY | Admitting: PSYCHIATRY & NEUROLOGY
Payer: COMMERCIAL

## 2017-12-27 PROCEDURE — 65220000003 HC RM SEMIPRIVATE PSYCH

## 2017-12-27 NOTE — IP AVS SNAPSHOT
[address] Patient: Roberto Rand MRN: [id number] NXB:[id number] About your hospitalization You were admitted on:  December 27, 2017 You last received care in the:  [facility] ADULT CHEM DEP You were discharged on:  December 31, 2017 Why you were hospitalized Your primary diagnosis was:  Mdd (Major Depressive Disorder), Recurrent Severe, Without Psychosis (Hcc) Your diagnoses also included:  Bipolar 1 Disorder (Hcc), Ptsd (Post-Traumatic Stress Disorder) Things You Need To Do (next 8 weeks) Follow up with [address] [place] [address]  
patient will need to follow up with her therapist  Antoine Martin and Dimitry Angeles for medications Phone:  [phone] Where:  [address] [facility], [address] [place] [id number] Discharge Orders None A check eugene indicates which time of day the medication should be taken. My Medications STOP taking these medications   
 hydrOXYzine HCl 50 mg tablet Commonly known as:  ATARAX  
   
  
 indomethacin 25 mg capsule Commonly known as:  INDOCIN  
   
  
 lisinopril 5 mg tablet Commonly known as:  PRINIVIL, ZESTRIL  
   
  
  
TAKE these medications as instructed Instructions Each Dose to Equal  
 Morning Noon Evening Bedtime  
 amitriptyline 25 mg tablet Commonly known as:  ELAVIL Your last dose was: Your next dose is: Take 1 Tab by mouth nightly. Indications: Depression 25 mg  
    
   
   
   
  
 citalopram 20 mg tablet Commonly known as:  Loreto Arreguin Start taking on:  1/1/2018 Your last dose was: Your next dose is: Take 1 Tab by mouth daily. Indications: major depressive disorder 20 mg  
    
   
   
   
  
 * gabapentin 600 mg tablet Commonly known as:  NEURONTIN Your last dose was: Your next dose is: Take 800 mg by mouth three (3) times daily. 800 mg  
    
   
   
   
  
 * gabapentin 400 mg capsule Commonly known as:  NEURONTIN Your last dose was: Your next dose is: Take 1 Cap by mouth two (2) times a day. Indications: NEUROPATHIC PAIN  
 400 mg  
    
   
   
   
  
 * gabapentin 400 mg capsule Commonly known as:  NEURONTIN Your last dose was: Your next dose is: Take 2 Caps by mouth nightly. Indications: NEUROPATHIC PAIN  
 800 mg  
    
   
   
   
  
 lamoTRIgine 100 mg tablet Commonly known as: LaMICtal  
Start taking on:  1/1/2018 Your last dose was: Your next dose is: Take 1 Tab by mouth daily. Indications: DEPRESSION ASSOCIATED WITH BIPOLAR DISORDER  
 100 mg  
    
   
   
   
  
 potassium chloride SA 10 mEq capsule Commonly known as:  Anastacia Bergoo Your last dose was: Your next dose is: Take 1 Cap by mouth two (2) times a day. 10 mEq  
    
   
   
   
  
 prazosin 1 mg capsule Commonly known as:  MINIPRESS Your last dose was: Your next dose is: Take 1 Cap by mouth nightly. Indications: CHRONIC PTSD WITH TRAUMA NIGHTMARES  
 1 mg QUEtiapine 400 mg tablet Commonly known as:  SEROquel Your last dose was: Your next dose is: Take 1 Tab by mouth nightly. Indications: DEPRESSION TREATMENT ADJUNCT  
 400 mg  
    
   
   
   
  
 rOPINIRole 1 mg tablet Commonly known as:  Sarai Jara Your last dose was: Your next dose is: Take 1 Tab by mouth nightly. Indications: Restless Legs Syndrome 1 mg  
    
   
   
   
  
 scopolamine 1 mg Pt3d Commonly known as:  TRANSDERM-SCOP Your last dose was: Your next dose is:    
   
   
 1 Patch by TransDERmal route every seventy-two (72) hours. 1 Patch  
    
   
   
   
  
 traZODone 50 mg tablet Commonly known as:  Jayy Marcus  
   
 Your last dose was: Your next dose is: Take 1 Tab by mouth nightly as needed for Sleep. Indications: insomnia associated with depression 50 mg  
    
   
   
   
  
 * Notice: This list has 3 medication(s) that are the same as other medications prescribed for you. Read the directions carefully, and ask your doctor or other care provider to review them with you. Where to Get Your Medications Information on where to get these meds will be given to you by the nurse or doctor. ! Ask your nurse or doctor about these medications  
  amitriptyline 25 mg tablet  
 citalopram 20 mg tablet  
 gabapentin 400 mg capsule  
 gabapentin 400 mg capsule  
 lamoTRIgine 100 mg tablet  
 prazosin 1 mg capsule QUEtiapine 400 mg tablet  
 rOPINIRole 1 mg tablet  
 traZODone 50 mg tablet Discharge Instructions BEHAVIORAL HEALTH NURSING DISCHARGE NOTE Emergency Numbers [facility] Desk: [phone] [facility]: [phone] Suicide Prevention Line: [phone] (TALK) The following personal items collected during your admission are returned to you:  
Dental Appliance: Dental Appliances: None Vision: Visual Aid: Glasses, With patient Hearing Aid:   
Jewelry:   
Clothing:   
Other Valuables:   
Valuables sent to safe: The discharge information has been reviewed with the patient. The patient verbalized understanding. [employer] Activation Thank you for requesting access to [employer]. Please follow the instructions below to securely access and download your online medical record. [employer] allows you to send messages to your doctor, view your test results, renew your prescriptions, schedule appointments, and more. How Do I Sign Up? In your internet browser, go to [email or web address] Click on the First Time User? Click Here link in the Sign In box. You will be redirect to the New Member Sign Up page. Enter your [employer] Access Code exactly as it appears below. You will not need to use this code after youve completed the sign-up process. If you do not sign up before the expiration date, you must request a new code. [employer] Access Code: [email or web address] Expires: 3/27/2018  3:43 PM (This is the date your [employer] access code will ) Enter the last four digits of your Social Security Number (xxxx) and Date of Birth (mm/dd/yyyy) as indicated and click Submit. You will be taken to the next sign-up page. Create a Babblet ID. This will be your [employer] login ID and cannot be changed, so think of one that is secure and easy to remember. Create a [employer] password. You can change your password at any time. Enter your Password Reset Question and Answer. This can be used at a later time if you forget your password. Enter your e-mail address. You will receive e-mail notification when new information is available in [address] Click Sign Up. You can now view and download portions of your medical record. Click the [place] link to download a portable copy of your medical information. Additional Information If you have questions, please visit the Frequently Asked Questions section of the [employer] website at [email or web address] Remember, [employer] is NOT to be used for urgent needs. For medical emergencies, dial 911. Patient armband removed and shredded Introducing [facility]! [facility] introduces [employer] patient portal. Now you can access parts of your medical record, email your doctor's office, and request medication refills online. 1. In your internet browser, go to [email or web address] 2. Click on the First Time User? Click Here link in the Sign In box. You will see the New Member Sign Up page. 3. Enter your [employer] Access Code exactly as it appears below.  You will not need to use this code after youve completed the sign-up process. If you do not sign up before the expiration date, you must request a new code. · [employer] Access Code: [email or web address] Expires: 3/27/2018  3:43 PM 
 
4. Enter the last four digits of your Social Security Number (xxxx) and Date of Birth (mm/dd/yyyy) as indicated and click Submit. You will be taken to the next sign-up page. 5. Create a [employer] ID. This will be your [employer] login ID and cannot be changed, so think of one that is secure and easy to remember. 6. Create a [employer] password. You can change your password at any time. 7. Enter your Password Reset Question and Answer. This can be used at a later time if you forget your password. 8. Enter your e-mail address. You will receive e-mail notification when new information is available in [address] 9. Click Sign Up. You can now view and download portions of your medical record. 10. Click the Download Summary menu link to download a portable copy of your medical information. If you have questions, please visit the Frequently Asked Questions section of the [employer] website. Remember, [employer] is NOT to be used for urgent needs. For medical emergencies, dial 911. Now available from your iPhone and Android! Providers Seen During Your Hospitalization Provider Specialty Primary office phone Mark Levin MD Psychiatry [phone] Your Primary Care Physician (PCP) Primary Care Physician Office Phone Office Fax Nneka Webb [phone] You are allergic to the following Allergen Reactions Morphine Anaphylaxis Recent Documentation Height Weight Breastfeeding? BMI OB Status Smoking Status 1.727 m 93.9 kg No 31.47 kg/m2 Hysterectomy Current Every Day Smoker Emergency Contacts Name Discharge Info Relation Home Work Mobile [address] CAREGIVER [3] Parent [1] [phone] Karly Carrasco  Other Relative [6] [phone] [address]  Parent [1] [phone] Patient Belongings The following personal items are in your possession at time of discharge: 
  Dental Appliances: None  Visual Aid: Glasses, With patient Please provide this summary of care documentation to your next provider. Signatures-by signing, you are acknowledging that this After Visit Summary has been reviewed with you and you have received a copy. Patient Signature:  ____________________________________________________________ Date:  ____________________________________________________________  
  
Neptali Padilla Provider Signature:  ____________________________________________________________ Date:  ____________________________________________________________

## 2017-12-28 PROBLEM — F31.9 BIPOLAR 1 DISORDER (HCC): Status: ACTIVE | Noted: 2017-12-28

## 2017-12-28 PROBLEM — F43.10 PTSD (POST-TRAUMATIC STRESS DISORDER): Chronic | Status: ACTIVE | Noted: 2017-12-28

## 2017-12-28 PROBLEM — F31.9 BIPOLAR 1 DISORDER (HCC): Status: RESOLVED | Noted: 2017-12-28 | Resolved: 2017-12-28

## 2017-12-28 LAB
ALBUMIN SERPL-MCNC: 3.1 G/DL (ref 3.4–5)
ALBUMIN/GLOB SERPL: 1 {RATIO} (ref 0.8–1.7)
ALP SERPL-CCNC: 57 U/L (ref 45–117)
ALT SERPL-CCNC: 12 U/L (ref 13–56)
ANION GAP SERPL CALC-SCNC: 9 MMOL/L (ref 3–18)
AST SERPL-CCNC: 10 U/L (ref 15–37)
BASOPHILS # BLD: 0 K/UL (ref 0–0.1)
BASOPHILS NFR BLD: 0 % (ref 0–2)
BILIRUB SERPL-MCNC: 0.2 MG/DL (ref 0.2–1)
BUN SERPL-MCNC: 18 MG/DL (ref 7–18)
BUN/CREAT SERPL: 28 (ref 12–20)
CALCIUM SERPL-MCNC: 8.5 MG/DL (ref 8.5–10.1)
CHLORIDE SERPL-SCNC: 114 MMOL/L (ref 100–108)
CO2 SERPL-SCNC: 22 MMOL/L (ref 21–32)
CREAT SERPL-MCNC: 0.64 MG/DL (ref 0.6–1.3)
DIFFERENTIAL METHOD BLD: NORMAL
EOSINOPHIL # BLD: 0.2 K/UL (ref 0–0.4)
EOSINOPHIL NFR BLD: 2 % (ref 0–5)
ERYTHROCYTE [DISTWIDTH] IN BLOOD BY AUTOMATED COUNT: 13.5 % (ref 11.6–14.5)
GLOBULIN SER CALC-MCNC: 3 G/DL (ref 2–4)
GLUCOSE SERPL-MCNC: 85 MG/DL (ref 74–99)
HCT VFR BLD AUTO: 36.3 % (ref 35–45)
HGB BLD-MCNC: 12.2 G/DL (ref 12–16)
LYMPHOCYTES # BLD: 2.8 K/UL (ref 0.9–3.6)
LYMPHOCYTES NFR BLD: 39 % (ref 21–52)
MCH RBC QN AUTO: 28.8 PG (ref 24–34)
MCHC RBC AUTO-ENTMCNC: 33.6 G/DL (ref 31–37)
MCV RBC AUTO: 85.8 FL (ref 74–97)
MONOCYTES # BLD: 0.6 K/UL (ref 0.05–1.2)
MONOCYTES NFR BLD: 9 % (ref 3–10)
NEUTS SEG # BLD: 3.4 K/UL (ref 1.8–8)
NEUTS SEG NFR BLD: 50 % (ref 40–73)
PLATELET # BLD AUTO: 308 K/UL (ref 135–420)
PMV BLD AUTO: 10.2 FL (ref 9.2–11.8)
POTASSIUM SERPL-SCNC: 3.5 MMOL/L (ref 3.5–5.5)
PROT SERPL-MCNC: 6.1 G/DL (ref 6.4–8.2)
RBC # BLD AUTO: 4.23 M/UL (ref 4.2–5.3)
SODIUM SERPL-SCNC: 145 MMOL/L (ref 136–145)
TSH SERPL DL<=0.05 MIU/L-ACNC: 2.88 UIU/ML (ref 0.36–3.74)
WBC # BLD AUTO: 7 K/UL (ref 4.6–13.2)

## 2017-12-28 PROCEDURE — 84443 ASSAY THYROID STIM HORMONE: CPT | Performed by: PSYCHIATRY & NEUROLOGY

## 2017-12-28 PROCEDURE — 74011250637 HC RX REV CODE- 250/637: Performed by: PSYCHIATRY & NEUROLOGY

## 2017-12-28 PROCEDURE — 65220000003 HC RM SEMIPRIVATE PSYCH

## 2017-12-28 PROCEDURE — 85025 COMPLETE CBC W/AUTO DIFF WBC: CPT | Performed by: PSYCHIATRY & NEUROLOGY

## 2017-12-28 PROCEDURE — 36415 COLL VENOUS BLD VENIPUNCTURE: CPT | Performed by: PSYCHIATRY & NEUROLOGY

## 2017-12-28 PROCEDURE — 80053 COMPREHEN METABOLIC PANEL: CPT | Performed by: PSYCHIATRY & NEUROLOGY

## 2017-12-28 RX ORDER — LORAZEPAM 1 MG/1
1-2 TABLET ORAL
Status: DISCONTINUED | OUTPATIENT
Start: 2017-12-28 | End: 2017-12-31 | Stop reason: HOSPADM

## 2017-12-28 RX ORDER — PRAZOSIN HYDROCHLORIDE 1 MG/1
1 CAPSULE ORAL
Status: DISCONTINUED | OUTPATIENT
Start: 2017-12-28 | End: 2017-12-31 | Stop reason: HOSPADM

## 2017-12-28 RX ORDER — HALOPERIDOL 5 MG/1
5 TABLET ORAL
Status: DISCONTINUED | OUTPATIENT
Start: 2017-12-28 | End: 2017-12-31 | Stop reason: HOSPADM

## 2017-12-28 RX ORDER — TRAZODONE HYDROCHLORIDE 50 MG/1
50 TABLET ORAL
Status: DISCONTINUED | OUTPATIENT
Start: 2017-12-28 | End: 2017-12-31 | Stop reason: HOSPADM

## 2017-12-28 RX ORDER — LISINOPRIL 2.5 MG/1
5 TABLET ORAL DAILY
Status: DISCONTINUED | OUTPATIENT
Start: 2017-12-28 | End: 2017-12-29

## 2017-12-28 RX ORDER — LORAZEPAM 2 MG/ML
1-2 INJECTION INTRAMUSCULAR
Status: DISCONTINUED | OUTPATIENT
Start: 2017-12-28 | End: 2017-12-31 | Stop reason: HOSPADM

## 2017-12-28 RX ORDER — ROPINIROLE 1 MG/1
1 TABLET, FILM COATED ORAL
Status: DISCONTINUED | OUTPATIENT
Start: 2017-12-28 | End: 2017-12-31 | Stop reason: HOSPADM

## 2017-12-28 RX ORDER — QUETIAPINE FUMARATE 200 MG/1
400 TABLET, FILM COATED ORAL
Status: DISCONTINUED | OUTPATIENT
Start: 2017-12-28 | End: 2017-12-31 | Stop reason: HOSPADM

## 2017-12-28 RX ORDER — LAMOTRIGINE 100 MG/1
100 TABLET ORAL DAILY
Status: DISCONTINUED | OUTPATIENT
Start: 2017-12-28 | End: 2017-12-31 | Stop reason: HOSPADM

## 2017-12-28 RX ORDER — GABAPENTIN 400 MG/1
800 CAPSULE ORAL
Status: DISCONTINUED | OUTPATIENT
Start: 2017-12-29 | End: 2017-12-31 | Stop reason: HOSPADM

## 2017-12-28 RX ORDER — ACETAMINOPHEN 500 MG
2 TABLET ORAL
Status: DISCONTINUED | OUTPATIENT
Start: 2017-12-28 | End: 2017-12-31 | Stop reason: HOSPADM

## 2017-12-28 RX ORDER — ROPINIROLE 0.25 MG/1
0.5 TABLET, FILM COATED ORAL
Status: DISCONTINUED | OUTPATIENT
Start: 2017-12-28 | End: 2017-12-28

## 2017-12-28 RX ORDER — IBUPROFEN 400 MG/1
400 TABLET ORAL
Status: DISCONTINUED | OUTPATIENT
Start: 2017-12-28 | End: 2017-12-31 | Stop reason: HOSPADM

## 2017-12-28 RX ORDER — POTASSIUM CHLORIDE 750 MG/1
10 TABLET, EXTENDED RELEASE ORAL 2 TIMES DAILY
Status: DISCONTINUED | OUTPATIENT
Start: 2017-12-28 | End: 2017-12-31 | Stop reason: HOSPADM

## 2017-12-28 RX ORDER — HALOPERIDOL 5 MG/ML
5 INJECTION INTRAMUSCULAR
Status: DISCONTINUED | OUTPATIENT
Start: 2017-12-28 | End: 2017-12-31 | Stop reason: HOSPADM

## 2017-12-28 RX ORDER — GABAPENTIN 400 MG/1
400 CAPSULE ORAL DAILY PRN
Status: DISCONTINUED | OUTPATIENT
Start: 2017-12-28 | End: 2017-12-31 | Stop reason: HOSPADM

## 2017-12-28 RX ORDER — AMITRIPTYLINE HYDROCHLORIDE 50 MG/1
25 TABLET, FILM COATED ORAL
Status: DISCONTINUED | OUTPATIENT
Start: 2017-12-28 | End: 2017-12-31 | Stop reason: HOSPADM

## 2017-12-28 RX ADMIN — QUETIAPINE FUMARATE 400 MG: 200 TABLET ORAL at 21:00

## 2017-12-28 RX ADMIN — PRAZOSIN HYDROCHLORIDE 1 MG: 1 CAPSULE ORAL at 21:11

## 2017-12-28 RX ADMIN — LISINOPRIL 5 MG: 10 TABLET ORAL at 21:06

## 2017-12-28 RX ADMIN — AMITRIPTYLINE HYDROCHLORIDE 25 MG: 50 TABLET, FILM COATED ORAL at 21:04

## 2017-12-28 RX ADMIN — ROPINIROLE HYDROCHLORIDE 0.5 MG: 0.25 TABLET, FILM COATED ORAL at 01:59

## 2017-12-28 RX ADMIN — LAMOTRIGINE 100 MG: 100 TABLET ORAL at 10:20

## 2017-12-28 RX ADMIN — POTASSIUM CHLORIDE 10 MEQ: 750 TABLET, EXTENDED RELEASE ORAL at 21:03

## 2017-12-28 RX ADMIN — TRAZODONE HYDROCHLORIDE 50 MG: 50 TABLET ORAL at 02:46

## 2017-12-28 RX ADMIN — TRAZODONE HYDROCHLORIDE 50 MG: 50 TABLET ORAL at 21:04

## 2017-12-28 RX ADMIN — POTASSIUM CHLORIDE 10 MEQ: 750 TABLET, EXTENDED RELEASE ORAL at 10:21

## 2017-12-28 RX ADMIN — ROPINIROLE HYDROCHLORIDE 1 MG: 1 TABLET, FILM COATED ORAL at 21:03

## 2017-12-28 RX ADMIN — QUETIAPINE FUMARATE 400 MG: 200 TABLET ORAL at 00:53

## 2017-12-28 RX ADMIN — IBUPROFEN 400 MG: 400 TABLET ORAL at 15:57

## 2017-12-28 RX ADMIN — IBUPROFEN 400 MG: 400 TABLET ORAL at 00:53

## 2017-12-28 RX ADMIN — LISINOPRIL 5 MG: 10 TABLET ORAL at 00:53

## 2017-12-28 RX ADMIN — LORAZEPAM 1 MG: 1 TABLET ORAL at 15:57

## 2017-12-28 RX ADMIN — LORAZEPAM 1 MG: 1 TABLET ORAL at 00:53

## 2017-12-28 RX ADMIN — GABAPENTIN 800 MG: 300 CAPSULE ORAL at 10:21
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
## 2017-12-28 NOTE — BH NOTES
Patient stated she initially had some trouble falling asleep however after receiving her Requip she was able to sleep a bit more restful. Patient has been in bed for majority of the morning due to early morning arrival. Patient up to see MD and has been provided with breakfast. Patient's weight has been obtained 208 lbs 5'7\". Patient denies auditory/visual hallucinations, denies suicidal/homicidal ideation with no safety issues noted. Will continue to monitor for safety with support as needed throughout treatment regimen.

## 2017-12-28 NOTE — BH NOTES
Patient has been visible in milieu. She denies suicidal/homicidal ideations and AV hallucinations. She agreed to seek staff if thoughts of self harm arises. Patient requested pain and anxiety medication. She received 400 mg Motrin and 1 mg of Ativan. She did not receive visitors. She ate dinner. Nursing will provide a safe and supportive environment.

## 2017-12-28 NOTE — BH NOTES
GROUP THERAPY PROGRESS NOTE    True Pavel is not participating in [place].      Group time: 30 minutes    Personal goal for participation: none    Goal orientation: community    Group therapy participation: passive    Therapeutic interventions reviewed and discussed: goals and procedures    Impression of participation: encouraged

## 2017-12-28 NOTE — BH NOTES
INGRID Note: The above pt has been in the bed majority of the morning this shift. She participated in all afternoon groups this shift. She was receptive to the rules of the unit this shift and the visiting hours this shift. Her affect appears flat mood appears depressed this shift. She has been visible on the phone with family and friends this shift which appeared to have went well. She has not experienced any falls this shift. She verbally contract for safety and denies any self-harm at this time during the shift. -A-Problem #1 cont. -P-Maintain a safe and supportive enviorment.

## 2017-12-28 NOTE — BH NOTES
GROUP THERAPY PROGRESS NOTE    Rosanna Alves is participating in [place]. Group time: 30 minutes    Personal goal for participation: rules/ regulations    Goal orientation: community    Group therapy participation: minimal    Therapeutic interventions reviewed and discussed: She was not a management problem     Impression of participation: She was alert and oriented but while in group she was receptive to the rules and regulations while in group.

## 2017-12-28 NOTE — H&P
History and Physical        Patient: Madelyn Diego               Sex: female          DOA: 12/27/2017         YOB: 1955      Age:  65 y.o.        LOS:  LOS: 1 day        HPI:     Madelyn Diego is a 65 y.o. female who was admitted under TDO experiencing depression and suicidal ideation. Active Problems:    Bipolar 1 disorder ([facility] [address] (12/28/2017)        Past Medical History:   Diagnosis Date    Arthritis     Chronic kidney disease     Depression     Hypertension     Seizures ([facility] [address]        No past surgical history on file. Family History   Problem Relation Age of Onset    Dizziness Father        Social History     Social History    Marital status:      Spouse name: N/A    Number of children: NONE    Years of education: HS grad     Social History Main Topics    Smoking status: Current Every Day Smoker     Years: 1.00    Smokeless tobacco: Never Used    Alcohol use No    Drug use: No    Sexual activity: Not Currently     Birth control/ protection: None     Other Topics Concern    Not on file     Social History Narrative    Lives with mother and niece. Appetite fair and sleep poor. Prior to Admission medications    Medication Sig Start Date End Date Taking? Authorizing Provider   potassium chloride SA (MICRO-K) 10 mEq capsule Take 1 Cap by mouth two (2) times a day. 10/10/17  Yes Yelena Romero NP   lisinopril (PRINIVIL, ZESTRIL) 5 mg tablet Take 1 Tab by mouth daily. 8/28/17  Yes Yelena Romero NP   QUEtiapine (SEROQUEL) 50 mg tablet Take 400 mg by mouth nightly. Yes Historical Provider   gabapentin (NEURONTIN) 600 mg tablet Take 800 mg by mouth three (3) times daily. Yes Historical Provider   indomethacin (INDOCIN) 25 mg capsule Take 2 Caps by mouth three (3) times daily. 10/10/17   Yelena Romero NP   scopolamine (TRANSDERM-SCOP) 1.5 mg (1 mg over 3 days) pt3d 1 Patch by TransDERmal route every seventy-two (72) hours.  9/25/17   Aani Soto Ezequiel Johns MD   hydrOXYzine (ATARAX) 50 mg tablet Take 50 mg by mouth three (3) times daily as needed for Itching. Historical Provider   rOPINIRole (REQUIP) 0.25 mg tablet Take 0.5 mg by mouth nightly. Historical Provider       Allergies   Allergen Reactions    Morphine Anaphylaxis       Review of Systems  A comprehensive review of systems was negative except for: bilateral knee pain, neck pain and low back pain. Physical Exam:      Visit Vitals    /85    Pulse 92    Temp 98.7 °F (37.1 °C)    Resp 20    Breastfeeding No       Physical Exam:    General:  Alert, cooperative, well developed, well nourished, obese,  female, no distress, appears stated age. Eyes:  Conjunctivae/corneas clear. PERRL, EOMs intact. Fundi benign   Ears:  Normal TMs and external ear canals both ears. Nose: Nares normal. Septum midline. Mucosa normal. No drainage or sinus tenderness. Mouth/Throat: Lips, mucosa, and tongue normal. Teeth and gums normal.   Neck: Supple, symmetrical, trachea midline, no adenopathy, thyroid: no enlargement/tenderness/nodules, no carotid bruit and no JVD. Back:   Symmetric, no curvature. ROM normal. No CVA tenderness. Lungs:   Clear to auscultation bilaterally. Heart:  Regular rate and rhythm, S1, S2 normal, no murmur, click, rub or gallop. Abdomen:   Soft, non-tender. Bowel sounds normal. No masses,  No organomegaly. Extremities: Extremities normal, atraumatic, no cyanosis or edema. Pulses: 2+ and symmetric all extremities. Skin: Skin color, texture, turgor normal. No rashes or lesions   Lymph nodes: Cervical, supraclavicular, and axillary nodes normal.   Neurologic: CNII-XII intact. Normal strength, sensation and reflexes throughout.            Assessment/Plan     Depression  Suicidal ideation  Increased stress  Back, neck bilateral, knee pain  Labs reviewed  Continue treatment per physician's orders  Continue treatment per physician's orders

## 2017-12-28 NOTE — BSMART NOTE
OCCUPATIONAL THERAPY PROGRESS NOTE  Group Time:  [id number]  Attendance: The patient attended full group. Participation:  The patient participated with moderate elaboration in the activity. Attention:  The patient was able to focus on the activity. Interaction:  The patient acknowledges others or responds to questions,  with no spontaneous interaction. Patient discussed feeling she cannot leave for long or do things \"for me\" due to care taking responsibilities. Information on possible support resources mentioned.

## 2017-12-28 NOTE — BSMART NOTE
ART THERAPY GROUP PROGRESS NOTE    PATIENT SCHEDULED FOR GROUP AT: 11:30    ATTENDANCE: Full    PARTICIPATION LEVEL: Participates fully in the art process    ATTENTION LEVEL: Able to focus on task    FOCUS: 113 Lisseth Amin: She was calm, compliant, and invested in the task at hand. She occasionally interacted with group members and offered others encouragement. Her approach to task was planned-out and organized, associations were logical and relevant.

## 2017-12-29 PROCEDURE — 65220000003 HC RM SEMIPRIVATE PSYCH

## 2017-12-29 PROCEDURE — 74011250637 HC RX REV CODE- 250/637: Performed by: PSYCHIATRY & NEUROLOGY

## 2017-12-29 RX ORDER — CITALOPRAM 20 MG/1
20 TABLET, FILM COATED ORAL DAILY
Status: DISCONTINUED | OUTPATIENT
Start: 2017-12-29 | End: 2017-12-31 | Stop reason: HOSPADM

## 2017-12-29 RX ORDER — LISINOPRIL 2.5 MG/1
5 TABLET ORAL DAILY
Status: DISCONTINUED | OUTPATIENT
Start: 2017-12-29 | End: 2017-12-31 | Stop reason: HOSPADM

## 2017-12-29 RX ADMIN — LORAZEPAM 1 MG: 1 TABLET ORAL at 20:52

## 2017-12-29 RX ADMIN — QUETIAPINE FUMARATE 400 MG: 200 TABLET ORAL at 20:53

## 2017-12-29 RX ADMIN — POTASSIUM CHLORIDE 10 MEQ: 750 TABLET, EXTENDED RELEASE ORAL at 08:48

## 2017-12-29 RX ADMIN — LAMOTRIGINE 100 MG: 100 TABLET ORAL at 08:48

## 2017-12-29 RX ADMIN — IBUPROFEN 400 MG: 400 TABLET ORAL at 14:08

## 2017-12-29 RX ADMIN — GABAPENTIN 800 MG: 400 CAPSULE ORAL at 20:55

## 2017-12-29 RX ADMIN — LORAZEPAM 1 MG: 1 TABLET ORAL at 13:24

## 2017-12-29 RX ADMIN — IBUPROFEN 400 MG: 400 TABLET ORAL at 08:52

## 2017-12-29 RX ADMIN — IBUPROFEN 400 MG: 400 TABLET ORAL at 20:52

## 2017-12-29 RX ADMIN — AMITRIPTYLINE HYDROCHLORIDE 25 MG: 50 TABLET, FILM COATED ORAL at 20:54

## 2017-12-29 RX ADMIN — LISINOPRIL 5 MG: 2.5 TABLET ORAL at 20:54

## 2017-12-29 RX ADMIN — ROPINIROLE HYDROCHLORIDE 1 MG: 1 TABLET, FILM COATED ORAL at 20:54

## 2017-12-29 RX ADMIN — PRAZOSIN HYDROCHLORIDE 1 MG: 1 CAPSULE ORAL at 20:52

## 2017-12-29 RX ADMIN — GABAPENTIN 400 MG: 400 CAPSULE ORAL at 12:11

## 2017-12-29 RX ADMIN — CITALOPRAM HYDROBROMIDE 20 MG: 20 TABLET ORAL at 12:09

## 2017-12-29 RX ADMIN — NICOTINE POLACRILEX 2 MG: 2 GUM, CHEWING ORAL at 13:24

## 2017-12-29 NOTE — BSMART NOTE
Group Time: 11am    Group Topic: Coping Skills     Group Participation: During discussion on goal setting pt affect flat with dysphoric mood, which she shared comes from over load of circumstances at her home. This includes having spent a lot of time over the years caring for her elderly mom as well as recently having allowed her niece & her son move into her home about a month ago after nieces  . .. Presented pt with some basic CBT principles she can elaborate on with her outpt therapist with [facility].

## 2017-12-29 NOTE — PROGRESS NOTES
[address] [facility] [address]  Inpatient Progress Note     Date of Service: 12/29/17  Hospital Day: 2     Subjective/Interval History   12/29/17    Treatment Team Notes:  Notes reviewed and/or discussed and report that Mary Paulino demonstrated no interval concerns. Patient interview: Mary Paulino was interviewed by this writer today. Appears depressed and states her mood is \"Not good. \"  Tried multiple antidepressants which were ineffective or caused unwanted side effects. Prozac: made her feel numb  Zoloft: side effect but does not recall side effect  Paxil: \"EYAL. \"  Effexor: +Side effects  Abilify: caused chest pain  Latuda: has not tried  Lexapro: uncertain of reaction  Celexa: has not tried. Wellbutrin: caused upset stomach. Remeron: has not tried      Objective     Vitals:    12/28/17 1057 12/28/17 2106 12/28/17 2110 12/29/17 0820   BP:  142/90 (!) 142/96 128/82   Pulse:  83 83 78   Resp:   16 18   Temp:   98.1 °F (36.7 °C) 98.6 °F (37 °C)   Weight: 93.9 kg (207 lb)      Height: 5' 8\" (1.727 m)          Mental Status Examination     Appearance/Hygiene 65 y.o. CF who appears depressed. Good hygiene.    Behavior/Social Relatedness Appropriate   Musculoskeletal Gait/Station: appropriate  Tone (flaccid, cogwheeling, spastic): appropriate  Psychomotor (hyperkinetic, hypokinetic): appropriate   Involuntary movements (tics, dyskinesias, akathisia, stereotypies): none   Speech   Rate, rhythm, volume, fluency and articulation are appropriate   Mood   depressed   Affect    depressed   Thought Process Linear and goal directed   Thought Content and Perceptual Disturbances Denies self-injurious behavior (SIB), suicidal ideation (SI), aggressive behavior or homicidal ideation (HI)    Denies auditory and visual hallucinations   Sensorium and Cognition  AOx4, attention intact, memory intact, language use appropriate, and fund of knowledge age appropriate   Insight  fair   Judgment fair Assessment/Plan      Psychiatric Diagnoses:   MDD (major depressive disorder), recurrent severe, without psychosis ([facility] [address] F33.2      PTSD (post-traumatic stress disorder) F43.10         Medical Diagnoses:        Patient Active Problem List   Diagnosis Code    Renal calculi N20.0    Essential hypertension with goal blood pressure less than 140/90 I10    Seizure disorder ([facility] [address] G40.909    MDD (major depressive disorder), recurrent severe, without psychosis ([facility] [address] F33.2    Thyroid nodule E04.1    Schwannoma D36.10    Chronic pain syndrome G89.4    Benign paroxysmal positional vertigo of left ear H81.12    PTSD (post-traumatic stress disorder) F43.10      Psychosocial and contextual factors: See H&P       Rosannaольга Alves is a 65 y.o. who is currently depressed and tried many antidepressants. Pt has not tried citalopram and is willing to try this medication for depression. R/B/Se discussed. Pt current denies SI, HI and AVH. 1.  MDD (major depressive disorder), recurrent severe, without psychosis   - start citalopram 20mg po every day. 2.  Continue other medications as prescribed. 3.  Reviewed instructions, risks, benefits and side effects of medications  4. Disposition/Discharge Date: home/1-2-2018. 5.  SW please assist with follow-up appts and removal of the pt's gun from her home.      Ezequiel Duarte MD [facility]  Psychiatry

## 2017-12-29 NOTE — PROGRESS NOTES
Problem: Suicide/Homicide (Adult/Pediatric)  Goal: *STG: Remains safe in hospital  Pt will verbally contract for safety and remains safe in the hospital.   Outcome: Progressing Towards Goal  Patient remains safe  Goal: *STG: Attends activities and groups  Pt will attend 2 to 3 groups daily while in the hospital.   Outcome: Progressing Towards Goal  Attending groups. .  Goal: *STG/LTG:  No longer expresses self destructive or suicidal/homicidal thoughts  Pt will deny any intent to harm self or others during hospital stay. Outcome: Progressing Towards Goal  Denies suicidal ideations. Comments: Patient denies suicidal thoughts. Medication compliant. Social with select peers.

## 2017-12-29 NOTE — BH NOTES
Benjamin Pedro is  participating in [place]. Group time: 1813    Personal goal for participation: Community announcement    Goal orientation: community    Group therapy participation: fully participated    Therapeutic interventions reviewed and discussed: Staff discussed  Staff discussed the Mental Health programs offered. Unit schedule for groups,  Visiting hours, patient advocate name and phone number and where this information is posted on the unit. , etc,,. Report any maintenance/housekeeping or treatment concerns to staff so it can be addressed. Impression of participation: Pt.did not have any maintenance/housekeeping or treatment concerns to report to staff .

## 2017-12-29 NOTE — BSMART NOTE
OCCUPATIONAL THERAPY PROGRESS NOTE  Group Time:  4630  Attendance: The patient attended 3/4 of group. The patient left and returned to activity at least once. Called out. Participation:  The patient participated with minimal elaboration in the activity. Attention:  The patient was able to focus on the activity. Interaction:  The patient acknowledges others or responds to questions,  with no spontaneous interaction. Participated when called on in discussion on stress management. ,

## 2017-12-29 NOTE — BSMART NOTE
Pt. Is a 65year old female with history of depression and OCD. Pt. Was admitted to this facility on a TDO for ideations to harm self with a plan. LAURA Contact:  SW met with pt to discuss d/c planning. Pt. Stated she came to the hospital been because she wanted to shot self with a gun. The pt. Stated she has been under a lot of stress lately. Pt sated her 40year old niece recently moved after loosing her spouse 3 weeks ago. The pt.'s 21year old great nephew lives in the home with his girlfriend. Pt. Stated they both do not financially contribute to the household. The pt. 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 Pt. was caring for her father who passed away 2 years ago from dementia. The pt also mention she recently loose her brother 3 months ago. Pt. Has been trying to cope with a lot of losses. SW discussed positive ways to cope with losses. SW also encouraged pt to set boundaries with her family members and positive conflict resolutions. SW engaged pt with positive goal setting and safety plan. Pt. Was provided information on caregiver's support group and services. Pt. Plans to return to her home. Pt. Will follow-up aftercare with [facility]. Pt. Is a  [employer] client . Pt. Will require a 14 day supply of medications at d/c.     LAURA Collateral:  LAURA provided [employer] CM with an update on the pt. CM met with pt. To discus services.

## 2017-12-29 NOTE — BH NOTES
GROUP THERAPY PROGRESS NOTE    Nahomi Ravi is  Not participating in [place].      Group time: 30 minutes    Personal goal for participation: none    Goal orientation: community    Group therapy participation: passive    Therapeutic interventions reviewed and discussed: goals and procedures    Impression of participation: encouraged

## 2017-12-29 NOTE — BH NOTES
Patient escorted to unit by RN from [facility]. Patient oriented to unit and to room. Patient's belongings accepted by this nurse and placed into proper locker. Patient has no medications due at this time.

## 2017-12-30 PROCEDURE — 74011250637 HC RX REV CODE- 250/637: Performed by: PSYCHIATRY & NEUROLOGY

## 2017-12-30 PROCEDURE — 65220000003 HC RM SEMIPRIVATE PSYCH

## 2017-12-30 RX ADMIN — AMITRIPTYLINE HYDROCHLORIDE 25 MG: 50 TABLET, FILM COATED ORAL at 21:11

## 2017-12-30 RX ADMIN — PRAZOSIN HYDROCHLORIDE 1 MG: 1 CAPSULE ORAL at 21:10

## 2017-12-30 RX ADMIN — IBUPROFEN 400 MG: 400 TABLET ORAL at 08:39

## 2017-12-30 RX ADMIN — POTASSIUM CHLORIDE 10 MEQ: 750 TABLET, EXTENDED RELEASE ORAL at 21:12

## 2017-12-30 RX ADMIN — ROPINIROLE HYDROCHLORIDE 1 MG: 1 TABLET, FILM COATED ORAL at 21:12

## 2017-12-30 RX ADMIN — IBUPROFEN 400 MG: 400 TABLET ORAL at 21:12

## 2017-12-30 RX ADMIN — POTASSIUM CHLORIDE 10 MEQ: 750 TABLET, EXTENDED RELEASE ORAL at 08:39

## 2017-12-30 RX ADMIN — LAMOTRIGINE 100 MG: 100 TABLET ORAL at 08:39

## 2017-12-30 RX ADMIN — IBUPROFEN 400 MG: 400 TABLET ORAL at 15:09

## 2017-12-30 RX ADMIN — LISINOPRIL 5 MG: 2.5 TABLET ORAL at 21:10

## 2017-12-30 RX ADMIN — LORAZEPAM 1 MG: 1 TABLET ORAL at 21:16

## 2017-12-30 RX ADMIN — LORAZEPAM 1 MG: 1 TABLET ORAL at 08:39

## 2017-12-30 RX ADMIN — GABAPENTIN 400 MG: 400 CAPSULE ORAL at 15:09

## 2017-12-30 RX ADMIN — QUETIAPINE FUMARATE 400 MG: 200 TABLET ORAL at 21:12

## 2017-12-30 RX ADMIN — GABAPENTIN 800 MG: 400 CAPSULE ORAL at 21:10

## 2017-12-30 RX ADMIN — LORAZEPAM 1 MG: 1 TABLET ORAL at 15:09

## 2017-12-30 RX ADMIN — TRAZODONE HYDROCHLORIDE 50 MG: 50 TABLET ORAL at 21:12

## 2017-12-30 RX ADMIN — CITALOPRAM HYDROBROMIDE 20 MG: 20 TABLET ORAL at 08:39

## 2017-12-30 NOTE — BH NOTES
Patient has participate in all unit activities this shift. She has eaten all meals. She is social with peers and cooperative with staff. Patient has taken a few rests in bed between activities. Patient did not receive any visitors.

## 2017-12-30 NOTE — BH NOTES
GROUP THERAPY PROGRESS NOTE    Satya Paulino is participating in [employer]. Group time: 30 minutes    Personal goal for participation: discussed unit guidelines and being able to maintain outside   of hospital    Goal orientation: personal    Group therapy participation: minimal    Therapeutic interventions reviewed and discussed:     Impression of participation: Pt sat quietly appearing to be listening to what was being discussed.

## 2017-12-30 NOTE — BH NOTES
GROUP THERAPY PROGRESS NOTE    Quynh De La Rosa is participating in Leisure-Creative Group and Topic Group. Group time: 1.5 hour    Goal orientation: personal    Group therapy participation: active    Therapeutic interventions reviewed and discussed:   Worksheet - Ten Ways to Have a Brighter Future. We reviewed and discussed the worksheet. The patients were given a variety of craft materials to make a New Year's card to say what they wish for themselves for 2018. Impression of participation:   Heather Singhsandrinechang participated fully in the group discussion. She appeared to really enjoy making her New Year's Card to herself. She wrote \"I will be more mindful in the New Year and accept others as they are\". She will enjoy her spiritual beliefs, cats, the ocean and gardening.

## 2017-12-30 NOTE — PROGRESS NOTES
Psychiatric Progress Note    Date: 17  Patient Name: Bernard Gonsalez  : 1955  MRN: [id number]  Hospital Day: 4      INTERVAL HISTORY:   Patient is \"much better\" today, has been taking meds. No issues from staff. Pt has no complaints. She is interested in leaving hospital soon. Not suicidal/ homicidal at this time. MENTAL STATUS EXAM:  Appearance: Dressed in casual clothes with fair grooming and hygiene  Behavior: Cooperative with good eye contact  Motor: No psychomotor agitation/retardation  Speech: Normal rate, tone and volume  Mood: \"better\"  Affect: euthymic  Thought Process: linear, goal-directed  Thought Content: Denies SI and HI  Perception: Denies AH or VH  Concentration: fair  Memory: fair  Cognition: Alert and oriented  Insight: Fair  Judgment: Fair    RISK ASSESSMENT:   Prior Attempts: no noted prior  Lethality of Attempts: none noted prior  Current Ideation/Plan: denies  Protective Factors: limited  Future Orientation: limited    ASSESSMENT: Improved, not suicidal.    Diagnoses:  MDD, recurrent, severe, without psychosis    Plan:  1. Continue with inpatient psychiatric treatment for containment, stabilization and medication management  2. Patient is to continue with group therapy  3. Medications:  Citalopram 20 mg po daily  4. SW to help with disposition  5.  ELOS 5-7 days

## 2017-12-31 VITALS
HEIGHT: 68 IN | BODY MASS INDEX: 31.37 KG/M2 | SYSTOLIC BLOOD PRESSURE: 129 MMHG | DIASTOLIC BLOOD PRESSURE: 92 MMHG | RESPIRATION RATE: 16 BRPM | WEIGHT: 207 LBS | TEMPERATURE: 96.7 F | HEART RATE: 94 BPM

## 2017-12-31 PROCEDURE — 74011250637 HC RX REV CODE- 250/637: Performed by: PSYCHIATRY & NEUROLOGY

## 2017-12-31 RX ORDER — CITALOPRAM 20 MG/1
20 TABLET, FILM COATED ORAL DAILY
Qty: 30 TAB | Refills: 0 | Status: SHIPPED | OUTPATIENT
Start: 2018-01-01 | End: 2018-08-30

## 2017-12-31 RX ORDER — ROPINIROLE 1 MG/1
1 TABLET, FILM COATED ORAL
Qty: 30 TAB | Refills: 0 | Status: SHIPPED | OUTPATIENT
Start: 2017-12-31

## 2017-12-31 RX ORDER — GABAPENTIN 400 MG/1
800 CAPSULE ORAL
Qty: 30 CAP | Refills: 0 | Status: SHIPPED | OUTPATIENT
Start: 2017-12-31 | End: 2018-08-30

## 2017-12-31 RX ORDER — TRAZODONE HYDROCHLORIDE 50 MG/1
50 TABLET ORAL
Qty: 30 TAB | Refills: 0 | Status: SHIPPED | OUTPATIENT
Start: 2017-12-31 | End: 2018-08-30

## 2017-12-31 RX ORDER — LAMOTRIGINE 100 MG/1
100 TABLET ORAL DAILY
Qty: 30 TAB | Refills: 0 | Status: SHIPPED | OUTPATIENT
Start: 2018-01-01 | End: 2018-08-30

## 2017-12-31 RX ORDER — AMITRIPTYLINE HYDROCHLORIDE 25 MG/1
25 TABLET, FILM COATED ORAL
Qty: 30 TAB | Refills: 0 | Status: SHIPPED | OUTPATIENT
Start: 2017-12-31 | End: 2018-10-05

## 2017-12-31 RX ORDER — QUETIAPINE FUMARATE 400 MG/1
400 TABLET, FILM COATED ORAL
Qty: 30 TAB | Refills: 0 | Status: SHIPPED | OUTPATIENT
Start: 2017-12-31

## 2017-12-31 RX ORDER — GABAPENTIN 400 MG/1
400 CAPSULE ORAL 2 TIMES DAILY
Qty: 60 CAP | Refills: 0 | Status: SHIPPED | OUTPATIENT
Start: 2017-12-31

## 2017-12-31 RX ORDER — PRAZOSIN HYDROCHLORIDE 1 MG/1
1 CAPSULE ORAL
Qty: 30 CAP | Refills: 0 | Status: SHIPPED | OUTPATIENT
Start: 2017-12-31 | End: 2019-01-10 | Stop reason: SDUPTHER

## 2017-12-31 RX ADMIN — POTASSIUM CHLORIDE 10 MEQ: 750 TABLET, EXTENDED RELEASE ORAL at 08:19

## 2017-12-31 RX ADMIN — LORAZEPAM 1 MG: 1 TABLET ORAL at 08:27

## 2017-12-31 RX ADMIN — LAMOTRIGINE 100 MG: 100 TABLET ORAL at 08:19

## 2017-12-31 RX ADMIN — CITALOPRAM HYDROBROMIDE 20 MG: 20 TABLET ORAL at 08:19

## 2017-12-31 RX ADMIN — IBUPROFEN 400 MG: 400 TABLET ORAL at 08:27
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
## 2017-12-31 NOTE — BH NOTES
Patient has been discharged off it with her belongings, discharge paperwork and prescriptions. Patient was going downstairs to call a cab and refused a bus ticket for transportation.

## 2017-12-31 NOTE — BH NOTES
Patient appears well groomed. Patient presents with a flat affect. Patient is pleasant during 1:1 interaction. Patient participated in group activities during shift. Pt. verbally contracted for safety, and remained safe during shift of care. Pt. took all prescribed evening medications.  Patient denied SI/HI and AVH during 1:1 interaction.

## 2017-12-31 NOTE — DISCHARGE INSTRUCTIONS
BEHAVIORAL HEALTH NURSING DISCHARGE NOTE      Emergency Numbers    : [facility] Emergency Services: [phone]  Suicide Prevention Line: 5 [phone] (TALK)      The following personal items collected during your admission are returned to you:   Dental Appliance: Dental Appliances: None  Vision: Visual Aid: Glasses, With patient  Hearing Aid:    Jewelry:    Clothing:    Other Valuables:    Valuables sent to safe: The discharge information has been reviewed with the patient. The patient verbalized understanding. [employer] Activation    Thank you for requesting access to [facility]. Please follow the instructions below to securely access and download your online medical record. [facility] allows you to send messages to your doctor, view your test results, renew your prescriptions, schedule appointments, and more. How Do I Sign Up? In your internet browser, go to [email or web address]."[email or web address]  Click on the First Time User? Click Here link in the Sign In box. You will be redirect to the New Member Sign Up page. Enter your [facility] Access Code exactly as it appears below. You will not need to use this code after youve completed the sign-up process. If you do not sign up before the expiration date, you must request a new code. [facility] Access Code: [email or web address]  Expires: 3/27/2018  3:43 PM (This is the date your [facility] access code will )    Enter the last four digits of your Social Security Number (xxxx) and Date of Birth (mm/dd/yyyy) as indicated and click Submit. You will be taken to the next sign-up page. Create a [facility] ID. This will be your [facility] login ID and cannot be changed, so think of one that is secure and easy to remember. Create a [facility] password. You can change your password at any time. Enter your Password Reset Question and Answer. This can be used at a later time if you forget your password. Enter your e-mail address.  You will receive e-mail notification when new information is available in [address] Click Sign Up. You can now view and download portions of your medical record. Click the [email or web address] link to download a portable copy of your medical information. Additional Information    If you have questions, please visit the Frequently Asked Questions section of the [employer] website at [email or web address] [email or web address]/[employer] Remember, [employer] is NOT to be used for urgent needs. For medical emergencies, dial 911.     Patient armband removed and shredded

## 2017-12-31 NOTE — PROGRESS NOTES
Problem: Suicide/Homicide (Adult/Pediatric)  Goal: *STG: Remains safe in hospital  Pt will verbally contract for safety and remains safe in the hospital.   Outcome: Progressing Towards Goal  Verbally contracted for safety and remains safe in the hospital.  Goal: *STG/LTG: Complies with medication therapy  Pt will be medication compliant daily. Outcome: Progressing Towards Goal  Took all prescribed evening medications. Goal: *STG/LTG:  No longer expresses self destructive or suicidal/homicidal thoughts  Pt will deny any intent to harm self or others during hospital stay. Outcome: Progressing Towards Goal  Denied SI/HI during 1:1. Comments:   Verbally contracted for safety and remains safe in the hospital.    Inga Hua all prescribed evening medications.     Denied SI/HI during 1:1.

## 2017-12-31 NOTE — BH NOTES
Ramone Dale  is participating in Taking care of your Mental Health. Group time: 1900    Personal goal for participation: Early  Warning Signs and Triggers    Goal orientation: social    Group therapy participation: active    Therapeutic interventions reviewed and discussed: Good Mental Health    Impression of participation: Pt. Shared and discussed how I contribute to my Mental Health in a Positive Way.

## 2018-03-02 ENCOUNTER — OFFICE VISIT (OUTPATIENT)
Dept: FAMILY MEDICINE CLINIC | Age: 63
End: 2018-03-02

## 2018-03-02 ENCOUNTER — HOSPITAL ENCOUNTER (OUTPATIENT)
Dept: LAB | Age: 63
Discharge: HOME | End: 2018-03-02
Payer: SELF-PAY

## 2018-03-02 VITALS
SYSTOLIC BLOOD PRESSURE: 122 MMHG | RESPIRATION RATE: 16 BRPM | TEMPERATURE: 96.2 F | DIASTOLIC BLOOD PRESSURE: 90 MMHG | WEIGHT: 199 LBS | HEART RATE: 92 BPM | OXYGEN SATURATION: 98 % | HEIGHT: 68 IN | BODY MASS INDEX: 30.16 KG/M2

## 2018-03-02 DIAGNOSIS — N39.0 URINARY TRACT INFECTION WITHOUT HEMATURIA, SITE UNSPECIFIED: ICD-10-CM

## 2018-03-02 DIAGNOSIS — R11.0 MODERATE NAUSEA: ICD-10-CM

## 2018-03-02 DIAGNOSIS — R35.0 FREQUENT URINATION: Primary | ICD-10-CM

## 2018-03-02 PROCEDURE — 87086 URINE CULTURE/COLONY COUNT: CPT | Performed by: NURSE PRACTITIONER

## 2018-03-02 PROCEDURE — 87077 CULTURE AEROBIC IDENTIFY: CPT | Performed by: NURSE PRACTITIONER

## 2018-03-02 PROCEDURE — 87186 SC STD MICRODIL/AGAR DIL: CPT | Performed by: NURSE PRACTITIONER

## 2018-03-02 RX ORDER — PROMETHAZINE HYDROCHLORIDE 25 MG/1
25 TABLET ORAL
Qty: 20 TAB | Refills: 0 | Status: SHIPPED | OUTPATIENT
Start: 2018-03-02 | End: 2018-08-30

## 2018-03-02 RX ORDER — NITROFURANTOIN 25; 75 MG/1; MG/1
100 CAPSULE ORAL 2 TIMES DAILY
Qty: 10 CAP | Refills: 0 | Status: SHIPPED | OUTPATIENT
Start: 2018-03-02 | End: 2018-03-07

## 2018-03-02 RX ORDER — PROMETHAZINE HYDROCHLORIDE 25 MG/1
25 TABLET ORAL
Qty: 20 TAB | Refills: 0 | Status: SHIPPED | OUTPATIENT
Start: 2018-03-02 | End: 2018-03-02 | Stop reason: SDUPTHER

## 2018-03-02 NOTE — PROGRESS NOTES
Chief Complaint   Patient presents with    Bladder Infection     she is a 65y.o. year old female who presents for evaluation increased urine frequency with decreased output, dysuria x 5 days. + POC urinalysis for leuks, nitrites. Will send for culture. Pt also with nausea. Reviewed and agree with Nurse Note duplicated in this note. Reviewed PmHx, RxHx, FmHx, SocHx, AllgHx and updated in chart. Patient Labs were reviewed: yes    Patient Past Records were reviewed:  yes    Review of Systems - negative except as listed above    Objective:     Vitals:    03/02/18 0846   BP: 122/90   Pulse: 92   Resp: 16   Temp: 96.2 °F (35.7 °C)   TempSrc: Oral   SpO2: 98%   Weight: 199 lb (90.3 kg)   Height: 5' 8\" (1.727 m)     Physical Examination: General appearance - alert, well appearing, and in no distress  Mental status - alert, oriented to person, place, and time  Chest - clear to auscultation, no wheezes, rales or rhonchi, symmetric air entry  Heart - normal rate, regular rhythm, normal S1, S2, no murmurs, rubs, clicks or gallops  Abdomen - no rebound tenderness noted  no abdominal bruits  + CVA tenderness    Extremities - peripheral pulses normal, no pedal edema, no clubbing or cyanosis    Assessment/ Plan:   Diagnoses and all orders for this visit:    1. Frequent urination  -     AMB POC URINALYSIS DIP STICK AUTO W/O MICRO    2. Urinary tract infection without hematuria, site unspecified  -     CULTURE, URINE; Future  -     nitrofurantoin, macrocrystal-monohydrate, (MACROBID) 100 mg capsule; Take 1 Cap by mouth two (2) times a day for 5 days. Indications: BACTERIAL URINARY TRACT INFECTION    3. Moderate nausea  -     promethazine (PHENERGAN) 25 mg tablet; Take 1 Tab by mouth every six (6) hours as needed for Nausea. Encounter Diagnoses   Name Primary?     Frequent urination Yes    Urinary tract infection without hematuria, site unspecified     Moderate nausea      Orders Placed This Encounter    CULTURE, URINE    AMB POC URINALYSIS DIP STICK AUTO W/O MICRO    DISCONTD: promethazine (PHENERGAN) 25 mg tablet    nitrofurantoin, macrocrystal-monohydrate, (MACROBID) 100 mg capsule    promethazine (PHENERGAN) 25 mg tablet     Follow-up Disposition: Not on File  There are no Patient Instructions on file for this visit. I have discussed the diagnosis with the patient and the intended plan as seen in the above orders. The patient has received an After-Visit Summary and questions were answered concerning future plans.      Medication Side Effects and Warnings were discussed with patient: yes      Leesa Mcburney, NP-C  [facility]

## 2018-03-02 NOTE — PROGRESS NOTES
Chief Complaint   Patient presents with    Bladder Infection     1 week . 1. Have you been to the ER, urgent care clinic since your last visit? Hospitalized since your last visit? No    2. Have you seen or consulted any other health care providers outside of the [address] [place] [address] since your last visit? Include any pap smears or colon screening.  No

## 2018-03-05 ENCOUNTER — TELEPHONE (OUTPATIENT)
Dept: FAMILY MEDICINE CLINIC | Age: 63
End: 2018-03-05

## 2018-03-05 DIAGNOSIS — N39.0 URINARY TRACT INFECTION WITHOUT HEMATURIA, SITE UNSPECIFIED: Primary | ICD-10-CM

## 2018-03-05 LAB
BACTERIA SPEC CULT: ABNORMAL
BACTERIA SPEC CULT: ABNORMAL
SERVICE CMNT-IMP: ABNORMAL

## 2018-03-05 RX ORDER — SULFAMETHOXAZOLE AND TRIMETHOPRIM 800; 160 MG/1; MG/1
1 TABLET ORAL 2 TIMES DAILY
Qty: 14 TAB | Refills: 0 | Status: SHIPPED | OUTPATIENT
Start: 2018-03-05 | End: 2018-03-12

## 2018-03-05 NOTE — PROGRESS NOTES
Please let pt know she was rx'd appropriate medication, if she should return if symptoms do not improve

## 2018-03-05 NOTE — TELEPHONE ENCOUNTER
Spoke with patient, confirmed name and . Advised patient of below results message, per NP Oliver Mendes. Patient verbalized understanding. Stated that her sxs have not improved on current medication, requested that her medication be changed. Spoke to NP Oliver Mendes who agreed, but stated that if after 3 days the pt's sxs are not improving then the pt would have to return to the office for an appointment, Pt verbalized understanding.      Please advise

## 2018-03-05 NOTE — TELEPHONE ENCOUNTER
----- Message from Vee Powell NP sent at 3/5/2018  8:52 AM EST -----  Please let pt know she was rx'd appropriate medication, if she should return if symptoms do not improve

## 2018-03-06 NOTE — TELEPHONE ENCOUNTER
Attempted to contact pt at  number, no answer. Lvm for pt to stop taking Potssium supplement while on new medication, and to call our office at [phone] with any further questions or concerns.

## 2018-03-13 ENCOUNTER — HOSPITAL ENCOUNTER (OUTPATIENT)
Dept: LAB | Age: 63
Discharge: HOME | End: 2018-03-13
Payer: SELF-PAY

## 2018-03-13 ENCOUNTER — OFFICE VISIT (OUTPATIENT)
Dept: FAMILY MEDICINE CLINIC | Age: 63
End: 2018-03-13

## 2018-03-13 ENCOUNTER — TELEPHONE (OUTPATIENT)
Dept: FAMILY MEDICINE CLINIC | Age: 63
End: 2018-03-13

## 2018-03-13 VITALS
BODY MASS INDEX: 30.62 KG/M2 | SYSTOLIC BLOOD PRESSURE: 113 MMHG | RESPIRATION RATE: 20 BRPM | TEMPERATURE: 95.4 F | WEIGHT: 202 LBS | DIASTOLIC BLOOD PRESSURE: 74 MMHG | HEART RATE: 88 BPM | HEIGHT: 68 IN | OXYGEN SATURATION: 98 %

## 2018-03-13 DIAGNOSIS — R06.2 WHEEZING: ICD-10-CM

## 2018-03-13 DIAGNOSIS — R31.9 URINARY TRACT INFECTION WITH HEMATURIA, SITE UNSPECIFIED: ICD-10-CM

## 2018-03-13 DIAGNOSIS — E87.6 HYPOKALEMIA: ICD-10-CM

## 2018-03-13 DIAGNOSIS — R50.9 FEVER, UNSPECIFIED FEVER CAUSE: ICD-10-CM

## 2018-03-13 DIAGNOSIS — R10.9 BILATERAL FLANK PAIN: ICD-10-CM

## 2018-03-13 DIAGNOSIS — R31.9 HEMATURIA, UNSPECIFIED TYPE: Primary | ICD-10-CM

## 2018-03-13 DIAGNOSIS — N39.0 URINARY TRACT INFECTION WITH HEMATURIA, SITE UNSPECIFIED: ICD-10-CM

## 2018-03-13 LAB
ANION GAP SERPL CALC-SCNC: 9 MMOL/L (ref 3–18)
BASOPHILS # BLD: 0 K/UL (ref 0–0.06)
BASOPHILS NFR BLD: 1 % (ref 0–2)
BILIRUB UR QL STRIP: NEGATIVE
BUN SERPL-MCNC: 15 MG/DL (ref 7–18)
BUN/CREAT SERPL: 20 (ref 12–20)
CALCIUM SERPL-MCNC: 8.9 MG/DL (ref 8.5–10.1)
CHLORIDE SERPL-SCNC: 107 MMOL/L (ref 100–108)
CO2 SERPL-SCNC: 21 MMOL/L (ref 21–32)
CREAT SERPL-MCNC: 0.76 MG/DL (ref 0.6–1.3)
DIFFERENTIAL METHOD BLD: ABNORMAL
EOSINOPHIL # BLD: 0.2 K/UL (ref 0–0.4)
EOSINOPHIL NFR BLD: 4 % (ref 0–5)
ERYTHROCYTE [DISTWIDTH] IN BLOOD BY AUTOMATED COUNT: 15.9 % (ref 11.6–14.5)
GLUCOSE SERPL-MCNC: 96 MG/DL (ref 74–99)
GLUCOSE UR-MCNC: NEGATIVE MG/DL
HCT VFR BLD AUTO: 36.2 % (ref 35–45)
HGB BLD-MCNC: 11.6 G/DL (ref 12–16)
KETONES P FAST UR STRIP-MCNC: NEGATIVE MG/DL
LYMPHOCYTES # BLD: 1.5 K/UL (ref 0.9–3.6)
LYMPHOCYTES NFR BLD: 38 % (ref 21–52)
MCH RBC QN AUTO: 29 PG (ref 24–34)
MCHC RBC AUTO-ENTMCNC: 32 G/DL (ref 31–37)
MCV RBC AUTO: 90.5 FL (ref 74–97)
MONOCYTES # BLD: 0.3 K/UL (ref 0.05–1.2)
MONOCYTES NFR BLD: 8 % (ref 3–10)
NEUTS SEG # BLD: 1.9 K/UL (ref 1.8–8)
NEUTS SEG NFR BLD: 49 % (ref 40–73)
PH UR STRIP: 6.5 [PH] (ref 4.6–8)
PLATELET # BLD AUTO: 533 K/UL (ref 135–420)
PMV BLD AUTO: 10.4 FL (ref 9.2–11.8)
POTASSIUM SERPL-SCNC: 4.3 MMOL/L (ref 3.5–5.5)
PROT UR QL STRIP: NORMAL
RBC # BLD AUTO: 4 M/UL (ref 4.2–5.3)
SODIUM SERPL-SCNC: 137 MMOL/L (ref 136–145)
SP GR UR STRIP: 1.02 (ref 1–1.03)
UA UROBILINOGEN AMB POC: NORMAL (ref 0.2–1)
URINALYSIS CLARITY POC: NORMAL
URINALYSIS COLOR POC: NORMAL
URINE BLOOD POC: NORMAL
URINE LEUKOCYTES POC: NORMAL
URINE NITRITES POC: NEGATIVE
WBC # BLD AUTO: 3.8 K/UL (ref 4.6–13.2)

## 2018-03-13 PROCEDURE — 85025 COMPLETE CBC W/AUTO DIFF WBC: CPT | Performed by: NURSE PRACTITIONER

## 2018-03-13 PROCEDURE — 87077 CULTURE AEROBIC IDENTIFY: CPT | Performed by: NURSE PRACTITIONER

## 2018-03-13 PROCEDURE — 80048 BASIC METABOLIC PNL TOTAL CA: CPT | Performed by: NURSE PRACTITIONER

## 2018-03-13 PROCEDURE — 87086 URINE CULTURE/COLONY COUNT: CPT | Performed by: NURSE PRACTITIONER

## 2018-03-13 RX ORDER — SULFAMETHOXAZOLE AND TRIMETHOPRIM 800; 160 MG/1; MG/1
1 TABLET ORAL 2 TIMES DAILY
Qty: 60 TAB | Refills: 0 | Status: SHIPPED | OUTPATIENT
Start: 2018-03-13 | End: 2018-04-12

## 2018-03-13 RX ORDER — PHENAZOPYRIDINE HYDROCHLORIDE 100 MG/1
100 TABLET, FILM COATED ORAL
Qty: 9 TAB | Refills: 0 | Status: SHIPPED | OUTPATIENT
Start: 2018-03-13 | End: 2018-03-16

## 2018-03-13 RX ORDER — ALBUTEROL SULFATE 90 UG/1
1 AEROSOL, METERED RESPIRATORY (INHALATION)
Qty: 1 INHALER | Refills: 2 | Status: SHIPPED | OUTPATIENT
Start: 2018-03-13

## 2018-03-13 NOTE — PATIENT INSTRUCTIONS
Take antibiotic daily for next month. Come back tomorrow for xray. Take pyridium for urinary pain. Make sure you are drinking plenty of water.

## 2018-03-13 NOTE — TELEPHONE ENCOUNTER
Pt stated she is being treated for Kidney infection and was told by provider if it is not better after second prescription use to call and come back in to be seen. Pt was scheduled for 3/13 at 10:30am Same Day.

## 2018-03-13 NOTE — MR AVS SNAPSHOT
[address] [place] [address] [address] [address] 
[phone] Patient: Eyad Freed MRN: [id number] NI Visit Information Date & Time Provider Department Dept. Phone Encounter #  
 3/13/2018 10:30 AM Daria Cortez NP [address] [facility] [address] [phone] [id number] Upcoming Health Maintenance Date Due Hepatitis C Screening 1955 Pneumococcal 19-64 Medium Risk (1 of 1 - PPSV23) 1975 DTaP/Tdap/Td series (1 - Tdap) 1977 PAP AKA CERVICAL CYTOLOGY 1977 FOBT Q 1 YEAR AGE 50-75 2006 ZOSTER VACCINE AGE 60> 2016 BREAST CANCER SCRN MAMMOGRAM 2016 Influenza Age 5 to Adult 2017 Allergies as of 3/13/2018  Review Complete On: 3/13/2018 By: Daria Cortez NP Severity Noted Reaction Type Reactions Codeine High 2018    Anaphylaxis Morphine High 2016    Anaphylaxis Current Immunizations  Reviewed on 2017 Name Date Influenza Vaccine (Quad) PF 2016 Not reviewed this visit You Were Diagnosed With   
  
 Codes Comments Hematuria, unspecified type    -  Primary ICD-10-CM: R31.9 ICD-9-CM: 599.70 Bilateral flank pain     ICD-10-CM: R10.9 ICD-9-CM: 789.09 Urinary tract infection with hematuria, site unspecified     ICD-10-CM: N39.0, R31.9 ICD-9-CM: 599.0 Hypokalemia     ICD-10-CM: E87.6 ICD-9-CM: 276.8 Vitals BP Pulse Temp Resp Height(growth percentile) Weight(growth percentile) 113/74 88 95.4 °F (35.2 °C) (Oral) 20 5' 8\" (1.727 m) 202 lb (91.6 kg) SpO2 BMI OB Status Smoking Status 98% 30.71 kg/m2 Hysterectomy Current Every Day Smoker BMI and BSA Data Body Mass Index Body Surface Area 30.71 kg/m 2 2.1 m 2 Preferred Pharmacy Pharmacy Name Phone [facility], [address] [place] [address] [phone] Your Updated Medication List  
  
   
This list is accurate as of 3/13/18 11:26 AM.  Always use your most recent med list.  
  
  
  
  
 amitriptyline 25 mg tablet Commonly known as:  ELAVIL Take 1 Tab by mouth nightly. Indications: Depression  
  
 citalopram 20 mg tablet Commonly known as:  Tanisha Saenzise Take 1 Tab by mouth daily. Indications: major depressive disorder * gabapentin 600 mg tablet Commonly known as:  NEURONTIN Take 800 mg by mouth three (3) times daily. * gabapentin 400 mg capsule Commonly known as:  NEURONTIN Take 1 Cap by mouth two (2) times a day. Indications: NEUROPATHIC PAIN  
  
 * gabapentin 400 mg capsule Commonly known as:  NEURONTIN Take 2 Caps by mouth nightly. Indications: NEUROPATHIC PAIN  
  
 lamoTRIgine 100 mg tablet Commonly known as: LaMICtal  
Take 1 Tab by mouth daily. Indications: DEPRESSION ASSOCIATED WITH BIPOLAR DISORDER  
  
 phenazopyridine 100 mg tablet Commonly known as:  PYRIDIUM Take 1 Tab by mouth three (3) times daily (after meals) for 3 days. potassium chloride SA 10 mEq capsule Commonly known as:  Padmini Burks Take 1 Cap by mouth two (2) times a day. prazosin 1 mg capsule Commonly known as:  MINIPRESS Take 1 Cap by mouth nightly. Indications: CHRONIC PTSD WITH TRAUMA NIGHTMARES  
  
 promethazine 25 mg tablet Commonly known as:  PHENERGAN Take 1 Tab by mouth every six (6) hours as needed for Nausea. QUEtiapine 400 mg tablet Commonly known as:  SEROquel Take 1 Tab by mouth nightly. Indications: DEPRESSION TREATMENT ADJUNCT  
  
 rOPINIRole 1 mg tablet Commonly known as:  Gay  Take 1 Tab by mouth nightly. Indications: Restless Legs Syndrome  
  
 scopolamine 1 mg over 3 days Pt3d Commonly known as:  TRANSDERM-SCOP  
1 Patch by TransDERmal route every seventy-two (72) hours. traZODone 50 mg tablet Commonly known as:  Taran Martinez  
 Take 1 Tab by mouth nightly as needed for Sleep. Indications: insomnia associated with depression  
  
 trimethoprim-sulfamethoxazole 160-800 mg per tablet Commonly known as:  BACTRIM DS, SEPTRA DS Take 1 Tab by mouth two (2) times a day for 30 days. Indications: PREVENTION OF BACTERIAL URINARY TRACT INFECTION  
  
 * Notice: This list has 3 medication(s) that are the same as other medications prescribed for you. Read the directions carefully, and ask your doctor or other care provider to review them with you. Prescriptions Sent to Pharmacy Refills  
 trimethoprim-sulfamethoxazole (BACTRIM DS, SEPTRA DS) 160-800 mg per tablet 0 Sig: Take 1 Tab by mouth two (2) times a day for 30 days. Indications: PREVENTION OF BACTERIAL URINARY TRACT INFECTION Class: Normal  
 Pharmacy: [address], [address] [place] [address] #: [phone] Route: Oral  
 phenazopyridine (PYRIDIUM) 100 mg tablet 0 Sig: Take 1 Tab by mouth three (3) times daily (after meals) for 3 days. Class: Normal  
 Pharmacy: [address], [address] [place] [address] Ph #: [phone] Route: Oral  
  
To-Do List   
 03/13/2018 Lab:  METABOLIC PANEL, BASIC   
  
 03/13/2018 Imaging:  XR ABD (KUB) Patient Instructions Take antibiotic daily for next month. Come back tomorrow for xray. Take pyridium for urinary pain. Make sure you are drinking plenty of water. Introducing [facility] & HEALTH SERVICES! [facility] introduces [employer] patient portal. Now you can access parts of your medical record, email your doctor's office, and request medication refills online. 1. In your internet browser, go to [email or web address] 2. Click on the First Time User? Click Here link in the Sign In box. You will see the New Member Sign Up page. 3. Enter your [employer] Access Code exactly as it appears below.  You will not need to use this code after youve completed the sign-up process. If you do not sign up before the expiration date, you must request a new code. · [employer] Access Code: [email or web address] Expires: 3/27/2018  4:43 PM 
 
4. Enter the last four digits of your Social Security Number (xxxx) and Date of Birth (mm/dd/yyyy) as indicated and click Submit. You will be taken to the next sign-up page. 5. Create a Dennoo ID. This will be your [employer] login ID and cannot be changed, so think of one that is secure and easy to remember. 6. Create a [employer] password. You can change your password at any time. 7. Enter your Password Reset Question and Answer. This can be used at a later time if you forget your password. 8. Enter your e-mail address. You will receive e-mail notification when new information is available in [address] 9. Click Sign Up. You can now view and download portions of your medical record. 10. Click the Download Summary menu link to download a portable copy of your medical information. If you have questions, please visit the Frequently Asked Questions section of the [employer] website. Remember, [employer] is NOT to be used for urgent needs. For medical emergencies, dial 911. Now available from your iPhone and Android! Please provide this summary of care documentation to your next provider. Your primary care clinician is listed as Leanne Aguayo. If you have any questions after today's visit, please call [phone].

## 2018-03-13 NOTE — ACP (ADVANCE CARE PLANNING)
Advance Directive:  1. Do you have an advance directive in place? Patient Reply:on file no changes to be made at this time    2. If not, would you like material regarding how to put one in place?  Patient Reply: no

## 2018-03-13 NOTE — PROGRESS NOTES
Chief Complaint   Patient presents with    Blood in Urine     pt states that she has a kidney infection and that the secondary antibiotic hasnt worked       Pt preferred language for health care discussion is [place]. Is someone accompanying this pt? no    Is the patient using any DME equipment during OV? no    Depression Screening:  PHQ over the last two weeks 7/14/2016   PHQ Not Done Active Diagnosis of Depression or Bipolar Disorder       Learning Assessment:  Learning Assessment 12/13/2016 7/14/2016   PRIMARY LEARNER Patient Patient   HIGHEST LEVEL OF EDUCATION - PRIMARY LEARNER  SOME COLLEGE GRADUATED HIGH SCHOOL OR GED   BARRIERS PRIMARY LEARNER NONE NONE   CO-LEARNER CAREGIVER No No   PRIMARY LANGUAGE ENGLISH ENGLISH   LEARNER PREFERENCE PRIMARY OTHER (COMMENT) READING   ANSWERED BY patient  self   RELATIONSHIP SELF SELF       Abuse Screening:  Abuse Screening Questionnaire 3/13/2018   Do you ever feel afraid of your partner? N   Are you in a relationship with someone who physically or mentally threatens you? N   Is it safe for you to go home? Y         Health Maintenance reviewed and discussed per provider. Yes    Pt is due for FIT test or Colonoscopy, Mammogram, Pap, Hep C screen, Zostavax, Pneumo-13 or Peumo-23, Flu, Tdap. Please order/place referral if appropriate. Pt currently taking Antiplatelet therapy? no      Coordination of Care:  1. Have you been to the ER, urgent care clinic since your last visit? Hospitalized since your last visit? no    2. Have you seen or consulted any other health care providers outside of the [address] [place] [address] since your last visit? Include any pap smears or colon screening.  no

## 2018-03-13 NOTE — PROGRESS NOTES
Chief Complaint   Patient presents with    Blood in Urine     pt states that she has a kidney infection and that the secondary antibiotic hasnt worked     she is a 65y.o. year old female who presents for evaluation of continued UTI symptoms. Pt initally started on nitrofuritan 3/2/2018 for UTI symptoms with POC U/A showing + leuk, + nit pt took medication and on 3/5/2018 culture showed mixed results for nitrofuritoin S/I, pt was still with symptoms- dysuria, blood in urine, urine frequency and abx switched to bactrim DS and pt instructed to stop potassium. She finished bactrim last night, pt continues with intermittent fevers, flank pain, dysuria. Has been on chronic abx in past for frequent UTIs, but has been off for about 4 yrs with no return of symptoms until this episode. Reviewed and agree with Nurse Note duplicated in this note. Reviewed PmHx, RxHx, FmHx, SocHx, AllgHx and updated in chart. Patient Labs were reviewed: yes    Patient Past Records were reviewed:  yes    Review of Systems - negative except as listed above    Objective:     Vitals:    03/13/18 1039   BP: 113/74   Pulse: 88   Resp: 20   Temp: 95.4 °F (35.2 °C)   TempSrc: Oral   SpO2: 98%   Weight: 202 lb (91.6 kg)   Height: 5' 8\" (1.727 m)     Physical Examination: General appearance - alert, well appearing, and in no distress  Mental status - alert, oriented to person, place, and time  Eyes - pupils equal and reactive, extraocular eye movements intact  Neck - supple, no significant adenopathy  Chest - wheezing noted B LL  Heart - normal rate, regular rhythm, normal S1, S2, no murmurs, rubs, clicks or gallops  Abdomen - tenderness noted B CVA  Skin - normal coloration and turgor, no rashes, no suspicious skin lesions noted    Assessment/ Plan:   Diagnoses and all orders for this visit:    1. Hematuria, unspecified type  -     XR ABD (KUB); Future  -     CULTURE, URINE; Future    2. Bilateral flank pain  -     XR ABD (KUB);  Future  - CULTURE, URINE; Future    3. Urinary tract infection with hematuria, site unspecified  -     trimethoprim-sulfamethoxazole (BACTRIM DS, SEPTRA DS) 160-800 mg per tablet; Take 1 Tab by mouth two (2) times a day for 30 days. Indications: PREVENTION OF BACTERIAL URINARY TRACT INFECTION  -     phenazopyridine (PYRIDIUM) 100 mg tablet; Take 1 Tab by mouth three (3) times daily (after meals) for 3 days. 4. Hypokalemia  -     METABOLIC PANEL, BASIC; Future    5. Fever, unspecified fever cause  -     CBC WITH AUTOMATED DIFF; Future    6. Wheezing  -     albuterol (PROVENTIL HFA, VENTOLIN HFA, PROAIR HFA) 90 mcg/actuation inhaler; Take 1 Puff by inhalation every four (4) hours as needed for Wheezing. Encounter Diagnoses   Name Primary?  Hematuria, unspecified type Yes    Bilateral flank pain     Urinary tract infection with hematuria, site unspecified     Hypokalemia     Fever, unspecified fever cause     Wheezing      Orders Placed This Encounter    CULTURE, URINE    XR ABD (KUB)    METABOLIC PANEL, BASIC    CBC WITH AUTOMATED DIFF    trimethoprim-sulfamethoxazole (BACTRIM DS, SEPTRA DS) 160-800 mg per tablet    phenazopyridine (PYRIDIUM) 100 mg tablet    albuterol (PROVENTIL HFA, VENTOLIN HFA, PROAIR HFA) 90 mcg/actuation inhaler     Follow-up Disposition: Not on File  Patient Instructions   Take antibiotic daily for next month. Come back tomorrow for xray. Take pyridium for urinary pain. Make sure you are drinking plenty of water. I have discussed the diagnosis with the patient and the intended plan as seen in the above orders. The patient has received an After-Visit Summary and questions were answered concerning future plans.      Medication Side Effects and Warnings were discussed with patient: yes      SARIAH PoseyC  [facility]

## 2018-03-14 NOTE — TELEPHONE ENCOUNTER
Spoke with patient, confirmed name and . Advised patient of below message, per NP Jignesh Major. Patient verbalized understanding.      Be advised

## 2018-03-14 NOTE — TELEPHONE ENCOUNTER
----- Message from Ej Lagunas NP sent at 3/14/2018  9:33 AM EDT -----  Please let pt know labs look ok, mild platelet elevations normal with recent infection.

## 2018-03-15 LAB
BACTERIA SPEC CULT: ABNORMAL
SERVICE CMNT-IMP: ABNORMAL

## 2018-08-01 RX ORDER — POTASSIUM CHLORIDE 750 MG/1
10 CAPSULE, EXTENDED RELEASE ORAL 2 TIMES DAILY
Qty: 60 CAP | Refills: 2 | Status: SHIPPED | OUTPATIENT
Start: 2018-08-01 | End: 2018-10-05

## 2018-08-20 DIAGNOSIS — G89.4 CHRONIC PAIN SYNDROME: Primary | ICD-10-CM

## 2018-08-20 RX ORDER — INDOMETHACIN 25 MG/1
25 CAPSULE ORAL 3 TIMES DAILY
Qty: 90 CAP | Refills: 2 | Status: SHIPPED | OUTPATIENT
Start: 2018-08-20 | End: 2018-10-05

## 2018-08-30 ENCOUNTER — OFFICE VISIT (OUTPATIENT)
Dept: FAMILY MEDICINE CLINIC | Age: 63
End: 2018-08-30

## 2018-08-30 VITALS
DIASTOLIC BLOOD PRESSURE: 96 MMHG | RESPIRATION RATE: 16 BRPM | HEART RATE: 94 BPM | BODY MASS INDEX: 32.04 KG/M2 | HEIGHT: 68 IN | TEMPERATURE: 95.5 F | OXYGEN SATURATION: 98 % | SYSTOLIC BLOOD PRESSURE: 139 MMHG | WEIGHT: 211.4 LBS

## 2018-08-30 DIAGNOSIS — H60.332 ACUTE SWIMMER'S EAR OF LEFT SIDE: Primary | ICD-10-CM

## 2018-08-30 RX ORDER — CIPROFLOXACIN 500 MG/1
500 TABLET ORAL 2 TIMES DAILY
Qty: 14 TAB | Refills: 0 | Status: SHIPPED | OUTPATIENT
Start: 2018-08-30 | End: 2018-09-06

## 2018-08-30 NOTE — MR AVS SNAPSHOT
[address] [address] 
[phone] Patient: Theron Todd MRN: [id number] Visit Information Date & Time Provider Department Dept. Phone Encounter #  
 2018 11:30 AM Timothy Hui MD [address] [facility] [address] [phone] [id number] Follow-up Instructions Return if symptoms worsen or fail to improve. Upcoming Health Maintenance Date Due Hepatitis C Screening 1955 Pneumococcal 19-64 Medium Risk (1 of 1 - PPSV23) 1975 DTaP/Tdap/Td series (1 - Tdap) 1977 PAP AKA CERVICAL CYTOLOGY 1977 FOBT Q 1 YEAR AGE 50-75 2006 ZOSTER VACCINE AGE 60> 2016 BREAST CANCER SCRN MAMMOGRAM 2016 Influenza Age 5 to Adult 2018 Allergies as of 2018  Review Complete On: 2018 By: Timothy Hui MD  
  
 Severity Noted Reaction Type Reactions Codeine High 2018    Anaphylaxis Morphine High 2016    Anaphylaxis Current Immunizations  Reviewed on 2017 Name Date Influenza Vaccine (Quad) PF 2016 Not reviewed this visit You Were Diagnosed With   
  
 Codes Comments Acute swimmer's ear of left side    -  Primary ICD-10-CM: K60.267 ICD-9-CM: 380.12 Vitals BP Pulse Temp Resp Height(growth percentile) Weight(growth percentile) (!) 139/96 94 95.5 °F (35.3 °C) (Oral) 16 5' 8\" (1.727 m) 211 lb 6.4 oz (95.9 kg) SpO2 BMI OB Status Smoking Status 98% 32.14 kg/m2 Hysterectomy Current Every Day Smoker Vitals History BMI and BSA Data Body Mass Index Body Surface Area  
 32.14 kg/m 2 2.15 m 2 Preferred Pharmacy Pharmacy Name Phone [facility], [address] [place] [address] [phone] Your Updated Medication List  
  
   
This list is accurate as of 18 12:24 PM.  Always use your most recent med list.  
  
  
  
  
 albuterol 90 mcg/actuation inhaler Commonly known as:  PROVENTIL HFA, VENTOLIN HFA, PROAIR HFA Take 1 Puff by inhalation every four (4) hours as needed for Wheezing. amitriptyline 25 mg tablet Commonly known as:  ELAVIL Take 1 Tab by mouth nightly. Indications: Depression  
  
 ciprofloxacin HCl 500 mg tablet Commonly known as:  CIPRO Take 1 Tab by mouth two (2) times a day for 7 days. ciprofloxacin-hydrocortisone otic suspension Commonly known as:  CIPRO HC OTIC Administer 3 Drops in left ear two (2) times a day for 7 days. gabapentin 400 mg capsule Commonly known as:  NEURONTIN Take 1 Cap by mouth two (2) times a day. Indications: NEUROPATHIC PAIN  
  
 indomethacin 25 mg capsule Commonly known as:  INDOCIN Take 1 Cap by mouth three (3) times daily for 90 days. potassium chloride SA 10 mEq capsule Commonly known as:  Edinson Balint Take 1 Cap by mouth two (2) times a day. prazosin 1 mg capsule Commonly known as:  MINIPRESS Take 1 Cap by mouth nightly. Indications: CHRONIC PTSD WITH TRAUMA NIGHTMARES QUEtiapine 400 mg tablet Commonly known as:  SEROquel Take 1 Tab by mouth nightly. Indications: DEPRESSION TREATMENT ADJUNCT  
  
 rOPINIRole 1 mg tablet Commonly known as:  Gene Tristeney Take 1 Tab by mouth nightly. Indications: Restless Legs Syndrome Prescriptions Sent to Pharmacy Refills  
 ciprofloxacin-hydrocortisone (CIPRO HC OTIC) otic suspension 0 Sig: Administer 3 Drops in left ear two (2) times a day for 7 days. Class: Normal  
 Pharmacy: [facility] Ph #: [phone] Route: Left Ear  
 ciprofloxacin HCl (CIPRO) 500 mg tablet 0 Sig: Take 1 Tab by mouth two (2) times a day for 7 days. Class: Normal  
 Pharmacy: [facility] Ph #: [phone]  Route: Oral  
  
 Follow-up Instructions Return if symptoms worsen or fail to improve. Patient Instructions Use ear drops twice a day for 7 days Place head on opposite side and allow ear drops to sit for at least 5 minutes before moving head up right Take cipro 500 mg twice a day for 7 days Take half dose of Seroquel or stop during days you are taking cipro then return to regular dose after you have finished course of antibiotoic Swimmer's Ear: Care Instructions Your Care Instructions Swimmer's ear (otitis externa) is inflammation or infection of the ear canal. This is the passage that leads from the outer ear to the eardrum. Any water, sand, or other debris that gets into the ear canal and stays there can cause swimmer's ear. Putting cotton swabs or other items in the ear to clean it can also cause this problem. Swimmer's ear can be very painful. But you can treat the pain and infection with medicines. You should feel better in a few days. Follow-up care is a key part of your treatment and safety. Be sure to make and go to all appointments, and call your doctor if you are having problems. It's also a good idea to know your test results and keep a list of the medicines you take. How can you care for yourself at home? Cleaning and care · Use antibiotic drops as your doctor directs. · Do not insert ear drops (other than the antibiotic ear drops) or anything else into the ear unless your doctor has told you to. · Avoid getting water in the ear until the problem clears up. Use cotton lightly coated with petroleum jelly as an earplug. Do not use plastic earplugs. · Use a hair dryer set on low to carefully dry the ear after you shower. · To ease ear pain, hold a warm washcloth against your ear. · Take pain medicines exactly as directed. ¨ If the doctor gave you a prescription medicine for pain, take it as prescribed.  
¨ If you are not taking a prescription pain medicine, ask your doctor if you can take an over-the-counter medicine. Inserting ear drops · Warm the drops to body temperature by rolling the container in your hands. Or you can place it in a cup of warm water for a few minutes. · Lie down, with your ear facing up. · Place drops inside the ear. Follow your doctor's instructions (or the directions on the label) for how many drops to use. Gently wiggle the outer ear or pull the ear up and back to help the drops get into the ear. · It's important to keep the liquid in the ear canal for 3 to 5 minutes. When should you call for help? Call your doctor now or seek immediate medical care if: 
  · You have a new or higher fever.  
  · You have new or worse pain, swelling, warmth, or redness around or behind your ear.  
  · You have new or increasing pus or blood draining from your ear.  
 Watch closely for changes in your health, and be sure to contact your doctor if: 
  · You are not getting better after 2 days (48 hours). Where can you learn more? Go to http://paulette-faizan.info/. Enter C706 in the search box to learn more about \"Swimmer's Ear: Care Instructions. \" Current as of: May 12, 2017 Content Version: 11.7 © 8546-2690 [employer]. Care instructions adapted under license by [employer] (which disclaims liability or warranty for this information). If you have questions about a medical condition or this instruction, always ask your healthcare professional. [place] [address] any warranty or liability for your use of this information. Introducing [facility]! Shraddha Mir introduces [employer] patient portal. Now you can access parts of your medical record, email your doctor's office, and request medication refills online. 1. In your internet browser, go to [email or web address] 2. Click on the First Time User? Click Here link in the Sign In box. You will see the New Member Sign Up page. 3. Enter your [employer] Access Code exactly as it appears below. You will not need to use this code after youve completed the sign-up process. If you do not sign up before the expiration date, you must request a new code. · [employer] Access Code: [email or web address] Expires: 11/28/2018 12:17 PM 
 
4. Enter the last four digits of your Social Security Number (xxxx) and Date of Birth (mm/dd/yyyy) as indicated and click Submit. You will be taken to the next sign-up page. 5. Create a Cloudstaff ID. This will be your [employer] login ID and cannot be changed, so think of one that is secure and easy to remember. 6. Create a [employer] password. You can change your password at any time. 7. Enter your Password Reset Question and Answer. This can be used at a later time if you forget your password. 8. Enter your e-mail address. You will receive e-mail notification when new information is available in [address] 9. Click Sign Up. You can now view and download portions of your medical record. 10. Click the Download Summary menu link to download a portable copy of your medical information. If you have questions, please visit the Frequently Asked Questions section of the [employer] website. Remember, [employer] is NOT to be used for urgent needs. For medical emergencies, dial 911. Now available from your iPhone and Android! Please provide this summary of care documentation to your next provider. Your primary care clinician is listed as Oscar Hercules. If you have any questions after today's visit, please call [phone].

## 2018-08-30 NOTE — PROGRESS NOTES
Chief Complaint   Patient presents with    Ear Pain     left ear pain for more than 2 mos. 1. Have you been to the ER, urgent care clinic since your last visit? Hospitalized since your last visit? No    2. Have you seen or consulted any other health care providers outside of the [address] [place] [address] since your last visit? Include any pap smears or colon screening.  No

## 2018-08-30 NOTE — PROGRESS NOTES
[address] [facility] INTERNAL MEDICINE NOTE    Chief Complaint   Patient presents with    Ear Pain     left ear pain for more than 2 mos. HPI: Shane Martins is a 59 y.o. female with PMHx significant for bipolar disorder and PTSD who presents with the above CC(s). 2 months ago noted loss of hearing in left ear and aching of ear intermittent. Using peroxide and olive oil would clear up. Pain worsened 1 week ago. Drainage started 1 month ago yellowish. Feels like ear is muffled, \"whooshing. \" Thinks low grade fever and chills started 1 week ago, no temperature check. No sore throat, eyes or nose symptoms. Some swelling below jaw on left side tender to palpation and associated headaches. LH for past week, no passing out. Past Medical Hx, Family Hx, Social Hx, Surgical Hx, Medications, Allergies: All reviewed and updated in EMR as appropriate. Current Outpatient Prescriptions   Medication Sig    ciprofloxacin-hydrocortisone (CIPRO HC OTIC) otic suspension Administer 3 Drops in left ear two (2) times a day for 7 days.  ciprofloxacin HCl (CIPRO) 500 mg tablet Take 1 Tab by mouth two (2) times a day for 7 days.  indomethacin (INDOCIN) 25 mg capsule Take 1 Cap by mouth three (3) times daily for 90 days.  potassium chloride SA (MICRO-K) 10 mEq capsule Take 1 Cap by mouth two (2) times a day.  albuterol (PROVENTIL HFA, VENTOLIN HFA, PROAIR HFA) 90 mcg/actuation inhaler Take 1 Puff by inhalation every four (4) hours as needed for Wheezing.  amitriptyline (ELAVIL) 25 mg tablet Take 1 Tab by mouth nightly. Indications: Depression    gabapentin (NEURONTIN) 400 mg capsule Take 1 Cap by mouth two (2) times a day. Indications: NEUROPATHIC PAIN    prazosin (MINIPRESS) 1 mg capsule Take 1 Cap by mouth nightly. Indications: CHRONIC PTSD WITH TRAUMA NIGHTMARES    QUEtiapine (SEROQUEL) 400 mg tablet Take 1 Tab by mouth nightly.  Indications: DEPRESSION TREATMENT ADJUNCT (Patient taking differently: Take 200 mg by mouth nightly. Indications: DEPRESSION TREATMENT ADJUNCT)    rOPINIRole (REQUIP) 1 mg tablet Take 1 Tab by mouth nightly. Indications: Restless Legs Syndrome (Patient taking differently: Take 2 mg by mouth nightly. Indications: Restless Legs Syndrome)     No current facility-administered medications for this visit. Health Maintenance   Topic Date Due    Hepatitis C Screening  1955    Pneumococcal 19-64 Medium Risk (1 of 1 - PPSV23) 04/26/1975    DTaP/Tdap/Td series (1 - Tdap) 04/26/1977    PAP AKA CERVICAL CYTOLOGY  04/26/1977    FOBT Q 1 YEAR AGE 50-75  04/26/2006    ZOSTER VACCINE AGE 60>  02/26/2016    BREAST CANCER SCRN MAMMOGRAM  07/30/2016    Influenza Age 5 to Adult  08/01/2018       OBJECTIVE:  Vitals:    08/30/18 1136   BP: (!) 139/96   Pulse: 94   Resp: 16   Temp: 95.5 °F (35.3 °C)   TempSrc: Oral   SpO2: 98%   Weight: 211 lb 6.4 oz (95.9 kg)   Height: 5' 8\" (1.727 m)       BP Readings from Last 3 Encounters:   08/30/18 (!) 139/96   03/13/18 113/74   03/02/18 122/90     Wt Readings from Last 3 Encounters:   08/30/18 211 lb 6.4 oz (95.9 kg)   03/13/18 202 lb (91.6 kg)   03/02/18 199 lb (90.3 kg)       General: Pleasant adult woman in no acute distress  Head: Head is atraumatic normo-cephalic. Eyes: Pupils equally round and reactive to light, extraocular muscle intact, conjunctiva clear, non-icterus. Ears: Ears right TM in LR intact. Left ear TTP tragus and visible swelling. Ear canal edema partially but not completely occluded with whitish discharge internal canal, unable to fully visualzie TM given swelling. Pain with manipulation of ear. Nose: Nares bilaterally mucosal membranes moist, no erythema. Nasal turbinates non-erythematous, non-boggy. Mouth: Oral mucosa moist without erythema, ulceration. Neck: Supple, + LAD submandibular on left side and posterior auricular. Neuro: Alert and oriented x3.          Nursing Notes Reviewed    ASSESSMENT AND PLAN:    ICD-10-CM ICD-9-CM    1. Acute swimmer's ear of left side H60.332 380.12 ciprofloxacin-hydrocortisone (CIPRO HC OTIC) otic suspension      ciprofloxacin HCl (CIPRO) 500 mg tablet  Discussed r/b/se of med including QT prolongation pt voiced understanding of plan  given report to hospital precautions       Orders Placed This Encounter    ciprofloxacin-hydrocortisone (CIPRO HC OTIC) otic suspension    ciprofloxacin HCl (CIPRO) 500 mg tablet       Future Appointments  Date Time Provider Juan Green   9/24/2018 10:00 AM Jarrett Wick MD [address] [place] [address]       After visit summary provided to patient    Assessment and plan above discussed with patient, patient voiced understanding and agreement with plan. More than 50% of this 25 min visit was spent face to face counseling the patient about the etiology and treatment options for the above health conditions outlined in assessment and plan    Jarrett Wick M.D.   [employer]  [address], [address] [place] [address] [phone]  [place] [phone]

## 2018-08-30 NOTE — PATIENT INSTRUCTIONS
Use ear drops twice a day for 7 days  Place head on opposite side and allow ear drops to sit for at least 5 minutes before moving head up right  Take cipro 500 mg twice a day for 7 days  Take half dose of Seroquel or stop during days you are taking cipro then return to regular dose after you have finished course of antibiotoic       Swimmer's Ear: Care Instructions  Your Care Instructions    Swimmer's ear (otitis externa) is inflammation or infection of the ear canal. This is the passage that leads from the outer ear to the eardrum. Any water, sand, or other debris that gets into the ear canal and stays there can cause swimmer's ear. Putting cotton swabs or other items in the ear to clean it can also cause this problem. Swimmer's ear can be very painful. But you can treat the pain and infection with medicines. You should feel better in a few days. Follow-up care is a key part of your treatment and safety. Be sure to make and go to all appointments, and call your doctor if you are having problems. It's also a good idea to know your test results and keep a list of the medicines you take. How can you care for yourself at home? Cleaning and care  · Use antibiotic drops as your doctor directs. · Do not insert ear drops (other than the antibiotic ear drops) or anything else into the ear unless your doctor has told you to. · Avoid getting water in the ear until the problem clears up. Use cotton lightly coated with petroleum jelly as an earplug. Do not use plastic earplugs. · Use a hair dryer set on low to carefully dry the ear after you shower. · To ease ear pain, hold a warm washcloth against your ear. · Take pain medicines exactly as directed. ¨ If the doctor gave you a prescription medicine for pain, take it as prescribed. ¨ If you are not taking a prescription pain medicine, ask your doctor if you can take an over-the-counter medicine.   Inserting ear drops  · Warm the drops to body temperature by rolling the container in your hands. Or you can place it in a cup of warm water for a few minutes. · Lie down, with your ear facing up. · Place drops inside the ear. Follow your doctor's instructions (or the directions on the label) for how many drops to use. Gently wiggle the outer ear or pull the ear up and back to help the drops get into the ear. · It's important to keep the liquid in the ear canal for 3 to 5 minutes. When should you call for help? Call your doctor now or seek immediate medical care if:    · You have a new or higher fever.     · You have new or worse pain, swelling, warmth, or redness around or behind your ear.     · You have new or increasing pus or blood draining from your ear.    Watch closely for changes in your health, and be sure to contact your doctor if:    · You are not getting better after 2 days (48 hours). Where can you learn more? Go to http://paulette-faizan.info/. Enter C706 in the search box to learn more about \"Swimmer's Ear: Care Instructions. \"  Current as of: May 12, 2017  Content Version: 11.7  © 8308-6727 [employer], [employer]. Care instructions adapted under license by [employer] (which disclaims liability or warranty for this information). If you have questions about a medical condition or this instruction, always ask your healthcare professional. [address] any warranty or liability for your use of this information.

## 2018-09-07 ENCOUNTER — OFFICE VISIT (OUTPATIENT)
Dept: FAMILY MEDICINE CLINIC | Age: 63
End: 2018-09-07

## 2018-09-07 VITALS
HEIGHT: 68 IN | RESPIRATION RATE: 18 BRPM | OXYGEN SATURATION: 97 % | SYSTOLIC BLOOD PRESSURE: 112 MMHG | WEIGHT: 217.2 LBS | DIASTOLIC BLOOD PRESSURE: 83 MMHG | TEMPERATURE: 97.2 F | HEART RATE: 96 BPM | BODY MASS INDEX: 32.92 KG/M2

## 2018-09-07 DIAGNOSIS — H60.332 ACUTE SWIMMER'S EAR OF LEFT SIDE: Primary | ICD-10-CM

## 2018-09-07 RX ORDER — OFLOXACIN 400 MG/1
400 TABLET, FILM COATED ORAL 2 TIMES DAILY
Qty: 10 TAB | Refills: 0 | Status: SHIPPED | OUTPATIENT
Start: 2018-09-07 | End: 2018-09-12

## 2018-09-07 NOTE — MR AVS SNAPSHOT
[place] [place] 
 
 
 [address] [facility] [address] [address] 
[phone] Patient: Bertha Mccann MRN: [id number] WKX:[id number] Visit Information Date & Time Provider Department Dept. Phone Encounter #  
 9/7/2018  3:15 PM Marquis Lady MD 2813 [facility] [phone] [id number] Follow-up Instructions Return if symptoms worsen or fail to improve. Your Appointments 9/24/2018 10:00 AM  
Office Visit with Marquis Lady MD  
[address] [facility] Appt Note: [address]  
 305 [facility] [address] [address]  
[phone]  
  
   
 [address] [place] [id number] [facility] Upcoming Health Maintenance Date Due Hepatitis C Screening 1955 Pneumococcal 19-64 Medium Risk (1 of 1 - PPSV23) 4/26/1975 DTaP/Tdap/Td series (1 - Tdap) 4/26/1977 PAP AKA CERVICAL CYTOLOGY 4/26/1977 FOBT Q 1 YEAR AGE 50-75 4/26/2006 ZOSTER VACCINE AGE 60> 2/26/2016 BREAST CANCER SCRN MAMMOGRAM 7/30/2016 Influenza Age 5 to Adult 8/1/2018 MEDICARE YEARLY EXAM 8/30/2018 Allergies as of 9/7/2018  Review Complete On: 9/7/2018 By: Lesley Sharma LPN Severity Noted Reaction Type Reactions Codeine High 03/13/2018    Anaphylaxis Morphine High 07/14/2016    Anaphylaxis Current Immunizations  Reviewed on 12/30/2017 Name Date Influenza Vaccine (Quad) PF 12/13/2016 Not reviewed this visit You Were Diagnosed With   
  
 Codes Comments Acute swimmer's ear of left side    -  Primary ICD-10-CM: V89.761 ICD-9-CM: 380.12 Vitals BP Pulse Temp Resp Height(growth percentile) Weight(growth percentile) 112/83 96 97.2 °F (36.2 °C) (Oral) 18 5' 8\" (1.727 m) 217 lb 3.2 oz (98.5 kg) SpO2 BMI OB Status Smoking Status 97% 33.03 kg/m2 Hysterectomy Current Every Day Smoker BMI and BSA Data Body Mass Index Body Surface Area 33.03 kg/m 2 2.17 m 2 Preferred Pharmacy Pharmacy Name Phone [facility], [address] [place] [address] [phone] Your Updated Medication List  
  
   
This list is accurate as of 9/7/18  3:47 PM.  Always use your most recent med list.  
  
  
  
  
 albuterol 90 mcg/actuation inhaler Commonly known as:  PROVENTIL HFA, VENTOLIN HFA, PROAIR HFA Take 1 Puff by inhalation every four (4) hours as needed for Wheezing. amitriptyline 25 mg tablet Commonly known as:  ELAVIL Take 1 Tab by mouth nightly. Indications: Depression  
  
 gabapentin 400 mg capsule Commonly known as:  NEURONTIN Take 1 Cap by mouth two (2) times a day. Indications: NEUROPATHIC PAIN  
  
 indomethacin 25 mg capsule Commonly known as:  INDOCIN Take 1 Cap by mouth three (3) times daily for 90 days. ofloxacin 400 mg tablet Commonly known as:  FLOXIN Take 1 Tab by mouth two (2) times a day for 5 days. potassium chloride SA 10 mEq capsule Commonly known as:  Tyrel Benders Take 1 Cap by mouth two (2) times a day. prazosin 1 mg capsule Commonly known as:  MINIPRESS Take 1 Cap by mouth nightly. Indications: CHRONIC PTSD WITH TRAUMA NIGHTMARES QUEtiapine 400 mg tablet Commonly known as:  SEROquel Take 1 Tab by mouth nightly. Indications: DEPRESSION TREATMENT ADJUNCT  
  
 rOPINIRole 1 mg tablet Commonly known as:  Frey Ramus Take 1 Tab by mouth nightly. Indications: Restless Legs Syndrome Prescriptions Sent to Pharmacy Refills  
 ofloxacin (FLOXIN) 400 mg tablet 0 Sig: Take 1 Tab by mouth two (2) times a day for 5 days. Class: Normal  
 Pharmacy: [facility], [address] [facility], [address] [facility] #: [phone] Route: Oral  
  
We Performed the Following REFERRAL TO ENT-OTOLARYNGOLOGY [WWU57 Custom] Comments: Otitis externa left ear with swelling in ear drum, unable to appreciate TM 
 
[facility] Follow-up Instructions Return if symptoms worsen or fail to improve. Referral Information Referral ID Referred By Referred To  
  
 [id number] Taye Swift Not Available Visits Status Start Date End Date 1 New Request 9/7/18 9/7/19 If your referral has a status of pending review or denied, additional information will be sent to support the outcome of this decision. Patient Instructions Use ear drops twice a day for 7 days Place head on opposite side and allow ear drops to sit for at least 5 minutes before moving head up right Take ofloxacin 400 mg twice a day for 5 days Avoid Seroquel during days you are taking cipro then return to regular dose after you have finished course of antibiotoic We are sending a referral to [facility] . You should be called about this in Monday or Tuesday next week to schedule appointment If pain gets worse, worsening fevers, or chills, hearing loss, more swelling please report urgently to [address] [place] [address],[facility], [address] ER Swimmer's Ear: Care Instructions Your Care Instructions Swimmer's ear (otitis externa) is inflammation or infection of the ear canal. This is the passage that leads from the outer ear to the eardrum. Any water, sand, or other debris that gets into the ear canal and stays there can cause swimmer's ear. Putting cotton swabs or other items in the ear to clean it can also cause this problem. Swimmer's ear can be very painful. But you can treat the pain and infection with medicines. You should feel better in a few days. Follow-up care is a key part of your treatment and safety. Be sure to make and go to all appointments, and call your doctor if you are having problems. It's also a good idea to know your test results and keep a list of the medicines you take. How can you care for yourself at home? Cleaning and care · Use antibiotic drops as your doctor directs. · Do not insert ear drops (other than the antibiotic ear drops) or anything else into the ear unless your doctor has told you to. · Avoid getting water in the ear until the problem clears up. Use cotton lightly coated with petroleum jelly as an earplug. Do not use plastic earplugs. · Use a hair dryer set on low to carefully dry the ear after you shower. · To ease ear pain, hold a warm washcloth against your ear. · Take pain medicines exactly as directed. ¨ If the doctor gave you a prescription medicine for pain, take it as prescribed. ¨ If you are not taking a prescription pain medicine, ask your doctor if you can take an over-the-counter medicine. Inserting ear drops · Warm the drops to body temperature by rolling the container in your hands. Or you can place it in a cup of warm water for a few minutes. · Lie down, with your ear facing up. · Place drops inside the ear. Follow your doctor's instructions (or the directions on the label) for how many drops to use. Gently wiggle the outer ear or pull the ear up and back to help the drops get into the ear. · It's important to keep the liquid in the ear canal for 3 to 5 minutes. When should you call for help? Call your doctor now or seek immediate medical care if: 
  · You have a new or higher fever.  
  · You have new or worse pain, swelling, warmth, or redness around or behind your ear.  
  · You have new or increasing pus or blood draining from your ear.  
 Watch closely for changes in your health, and be sure to contact your doctor if: 
  · You are not getting better after 2 days (48 hours). Where can you learn more? Go to http://paulette-faizan.info/. Enter C706 in the search box to learn more about \"Swimmer's Ear: Care Instructions. \" Current as of: May 12, 2017 Content Version: 11.7 © 5576-2424 [employer].  Care instructions adapted under license by [address] (which disclaims liability or warranty for this information). If you have questions about a medical condition or this instruction, always ask your healthcare professional. [address] any warranty or liability for your use of this information. Introducing [facility]! Maximino Nicole introduces Quotte patient portal. Now you can access parts of your medical record, email your doctor's office, and request medication refills online. 1. In your internet browser, go to [email or web address] 2. Click on the First Time User? Click Here link in the Sign In box. You will see the New Member Sign Up page. 3. Enter your Quotte Access Code exactly as it appears below. You will not need to use this code after youve completed the sign-up process. If you do not sign up before the expiration date, you must request a new code. · Quotte Access Code: [email or web address] Expires: 11/28/2018 12:17 PM 
 
4. Enter the last four digits of your Social Security Number (xxxx) and Date of Birth (mm/dd/yyyy) as indicated and click Submit. You will be taken to the next sign-up page. 5. Create a Quotte ID. This will be your Quotte login ID and cannot be changed, so think of one that is secure and easy to remember. 6. Create a Quotte password. You can change your password at any time. 7. Enter your Password Reset Question and Answer. This can be used at a later time if you forget your password. 8. Enter your e-mail address. You will receive e-mail notification when new information is available in [address] 9. Click Sign Up. You can now view and download portions of your medical record. 10. Click the Download Summary menu link to download a portable copy of your medical information. If you have questions, please visit the Frequently Asked Questions section of the Quotte website.  Remember, Quotte is NOT to be used for urgent needs. For medical emergencies, dial 911. Now available from your iPhone and Android! Please provide this summary of care documentation to your next provider. Your primary care clinician is listed as Julio C Camp. If you have any questions after today's visit, please call [phone].

## 2018-09-07 NOTE — PROGRESS NOTES
[facility] INTERNAL MEDICINE NOTE Chief Complaint Patient presents with  Ear Pain  
  left ear times 6 weeks HPI: Ngozi Easley is a 59 y.o. female  who presents with the above CC(s). Last seen 8/30/2018 for otitis externa. Ear pain worse since last visit. Pain and of head back and neck hurts. Some fevers and chills, unchanged from before. Did not  ear drops, was not aware that writer sent ear drops to pharmacy even thought this was written on AVS and given to patient at time of discharge. Past Medical Hx, Family Hx, Social Hx, Surgical Hx, Medications, Allergies: All reviewed and updated in EMR as appropriate. Current Outpatient Prescriptions Medication Sig  
 ofloxacin (FLOXIN) 400 mg tablet Take 1 Tab by mouth two (2) times a day for 5 days.  indomethacin (INDOCIN) 25 mg capsule Take 1 Cap by mouth three (3) times daily for 90 days.  potassium chloride SA (MICRO-K) 10 mEq capsule Take 1 Cap by mouth two (2) times a day.  amitriptyline (ELAVIL) 25 mg tablet Take 1 Tab by mouth nightly. Indications: Depression  gabapentin (NEURONTIN) 400 mg capsule Take 1 Cap by mouth two (2) times a day. Indications: NEUROPATHIC PAIN  prazosin (MINIPRESS) 1 mg capsule Take 1 Cap by mouth nightly. Indications: CHRONIC PTSD WITH TRAUMA NIGHTMARES  
 QUEtiapine (SEROQUEL) 400 mg tablet Take 1 Tab by mouth nightly. Indications: DEPRESSION TREATMENT ADJUNCT (Patient taking differently: Take 200 mg by mouth nightly. Indications: DEPRESSION TREATMENT ADJUNCT)  rOPINIRole (REQUIP) 1 mg tablet Take 1 Tab by mouth nightly. Indications: Restless Legs Syndrome (Patient taking differently: Take 2 mg by mouth nightly. Indications: Restless Legs Syndrome)  albuterol (PROVENTIL HFA, VENTOLIN HFA, PROAIR HFA) 90 mcg/actuation inhaler Take 1 Puff by inhalation every four (4) hours as needed for Wheezing. No current facility-administered medications for this visit. Health Maintenance Topic Date Due  
 Hepatitis C Screening  1955  Pneumococcal 19-64 Medium Risk (1 of 1 - PPSV23) 04/26/1975  
 DTaP/Tdap/Td series (1 - Tdap) 04/26/1977  PAP AKA CERVICAL CYTOLOGY  04/26/1977  
 FOBT Q 1 YEAR AGE 50-75  04/26/2006  ZOSTER VACCINE AGE 60>  02/26/2016  BREAST CANCER SCRN MAMMOGRAM  07/30/2016  Influenza Age 5 to Adult  08/01/2018  MEDICARE YEARLY EXAM  08/30/2018 OBJECTIVE: 
Vitals:  
 09/07/18 1516 BP: 112/83 Pulse: 96  
Resp: 18 Temp: 97.2 °F (36.2 °C) TempSrc: Oral  
SpO2: 97% Weight: 217 lb 3.2 oz (98.5 kg) Height: 5' 8\" (1.727 m) BP Readings from Last 3 Encounters:  
09/07/18 112/83  
08/30/18 (!) 139/96  
03/13/18 113/74 Wt Readings from Last 3 Encounters:  
09/07/18 217 lb 3.2 oz (98.5 kg) 08/30/18 211 lb 6.4 oz (95.9 kg) 03/13/18 202 lb (91.6 kg) EAR: Ears right TM in LR intact. Left ear TTP tragus and visible swelling. Ear canal edema partially but not completely occluded with whitish discharge internal canal, unable to fully visualzie TM given swelling. Pain with manipulation of ear. Nursing Notes Reviewed ASSESSMENT AND PLAN: 
  ICD-10-CM ICD-9-CM 1. Acute swimmer's ear of left side H60.332 380.12 REFERRAL TO ENT-OTOLARYNGOLOGY  
   ofloxacin (FLOXIN) 400 mg tablet Encouraged to  ear drops this time Given report to ER precuations Orders Placed This Encounter  REFERRAL TO ENT-OTOLARYNGOLOGY  ofloxacin (FLOXIN) 400 mg tablet Future Appointments Date Time Provider Juan Green 9/24/2018 10:00 AM [place] [address] [facility] After visit summary provided to patient Assessment and plan above discussed with patient, patient voiced understanding and agreement with plan. More than 50% of this 15 min visit was spent face to face counseling the patient about the etiology and treatment options for the above health conditions outlined in assessment and plan Javid Saeed M.D. [employer] [address] [facility], [address] [place], [address] [facility] - [phone] Fax - [phone]

## 2018-09-07 NOTE — PROGRESS NOTES
Shanetayler Floresn presents today for Chief Complaint Patient presents with  Ear Pain  
  left ear times 6 weeks Patient reports left ear pain has not gotten better. Pain is radiating down the left side of the neck. Patient states that now the right ear is causing her pain. Pain is 9 out of 10. Patient states she took tylenol today to help relieve some pain. Shane Martins preferred language for health care discussion is english/other. Is someone accompanying this pt? no 
 
Is the patient using any DME equipment during OV? no 
 
Depression Screening: PHQ over the last two weeks 9/7/2018 PHQ Not Done - Little interest or pleasure in doing things Not at all Feeling down, depressed, irritable, or hopeless Not at all Total Score PHQ 2 0 Learning Assessment: 
Learning Assessment 12/13/2016 PRIMARY LEARNER Patient HIGHEST LEVEL OF EDUCATION - PRIMARY LEARNER  SOME COLLEGE  
BARRIERS PRIMARY LEARNER NONE  
CO-LEARNER CAREGIVER No  
PRIMARY LANGUAGE ENGLISH  
LEARNER PREFERENCE PRIMARY OTHER (COMMENT) ANSWERED BY patient RELATIONSHIP SELF Abuse Screening: 
Abuse Screening Questionnaire 9/7/2018 Do you ever feel afraid of your partner? Olena Distel Are you in a relationship with someone who physically or mentally threatens you? Olena Distel Is it safe for you to go home? Pratik Wagoner Health Maintenance reviewed and discussed and ordered per Provider. Health Maintenance Due Topic Date Due  
 Hepatitis C Screening  1955  Pneumococcal 19-64 Medium Risk (1 of 1 - PPSV23) 04/26/1975  
 DTaP/Tdap/Td series (1 - Tdap) 04/26/1977  PAP AKA CERVICAL CYTOLOGY  04/26/1977  
 FOBT Q 1 YEAR AGE 50-75  04/26/2006  ZOSTER VACCINE AGE 60>  02/26/2016  BREAST CANCER SCRN MAMMOGRAM  07/30/2016  Influenza Age 5 to Adult  08/01/2018  MEDICARE YEARLY EXAM  08/30/2018 Audra Reid Shane Floresn is updated on all  Pt currently taking Antiplatelet therapy?  no 
 
 Coordination of Care: 1. Have you been to the ER, urgent care clinic since your last visit? no  Hospitalized since your last visit? no 
 
2. Have you seen or consulted any other health care providers outside of the [facility] since your last visit? no Include any pap smears or colon screening. no 
 
Advance Directive: 1. Do you have an advance directive in place? Patient Reply:yes 2. If not, would you like material regarding how to put one in place?  Patient Reply: no

## 2018-09-07 NOTE — PATIENT INSTRUCTIONS
Use ear drops twice a day for 7 days Place head on opposite side and allow ear drops to sit for at least 5 minutes before moving head up right Take ofloxacin 400 mg twice a day for 5 days Avoid Seroquel during days you are taking cipro then return to regular dose after you have finished course of antibiotoic We are sending a referral to [facility] . You should be called about this in Monday or Tuesday next week to schedule appointment If pain gets worse, worsening fevers, or chills, hearing loss, more swelling please report urgently to [address] [place] [address],[facility], [address] ER Swimmer's Ear: Care Instructions Your Care Instructions Swimmer's ear (otitis externa) is inflammation or infection of the ear canal. This is the passage that leads from the outer ear to the eardrum. Any water, sand, or other debris that gets into the ear canal and stays there can cause swimmer's ear. Putting cotton swabs or other items in the ear to clean it can also cause this problem. Swimmer's ear can be very painful. But you can treat the pain and infection with medicines. You should feel better in a few days. Follow-up care is a key part of your treatment and safety. Be sure to make and go to all appointments, and call your doctor if you are having problems. It's also a good idea to know your test results and keep a list of the medicines you take. How can you care for yourself at home? Cleaning and care · Use antibiotic drops as your doctor directs. · Do not insert ear drops (other than the antibiotic ear drops) or anything else into the ear unless your doctor has told you to. · Avoid getting water in the ear until the problem clears up. Use cotton lightly coated with petroleum jelly as an earplug. Do not use plastic earplugs. · Use a hair dryer set on low to carefully dry the ear after you shower. · To ease ear pain, hold a warm washcloth against your ear. · Take pain medicines exactly as directed. ¨ If the doctor gave you a prescription medicine for pain, take it as prescribed. ¨ If you are not taking a prescription pain medicine, ask your doctor if you can take an over-the-counter medicine. Inserting ear drops · Warm the drops to body temperature by rolling the container in your hands. Or you can place it in a cup of warm water for a few minutes. · Lie down, with your ear facing up. · Place drops inside the ear. Follow your doctor's instructions (or the directions on the label) for how many drops to use. Gently wiggle the outer ear or pull the ear up and back to help the drops get into the ear. · It's important to keep the liquid in the ear canal for 3 to 5 minutes. When should you call for help? Call your doctor now or seek immediate medical care if: 
  · You have a new or higher fever.  
  · You have new or worse pain, swelling, warmth, or redness around or behind your ear.  
  · You have new or increasing pus or blood draining from your ear.  
 Watch closely for changes in your health, and be sure to contact your doctor if: 
  · You are not getting better after 2 days (48 hours). Where can you learn more? Go to http://paulette-faizan.info/. Enter C706 in the search box to learn more about \"Swimmer's Ear: Care Instructions. \" Current as of: May 12, 2017 Content Version: 11.7 © 9092-6223 [employer]. Care instructions adapted under license by [employer] (which disclaims liability or warranty for this information). If you have questions about a medical condition or this instruction, always ask your healthcare professional. [place] [address] any warranty or liability for your use of this information.

## 2018-09-11 ENCOUNTER — TELEPHONE (OUTPATIENT)
Dept: FAMILY MEDICINE CLINIC | Age: 63
End: 2018-09-11

## 2018-09-11 NOTE — TELEPHONE ENCOUNTER
Called to discuss concerns. 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 Went to now [facility] and received antibiotic and ear drop which she is taking. Given number to ENT referral. All questions answered. Given report to ER precautions. [facility] \[facility] [place] [facility]\"" THE ENT  [address] [place] [address] Drive  [facility] [address]   [address] [facility], [address] [place] [address]  Phone: [phone]  Fax: [phone]    Javid Saeed M.D.   [employer]  [address] [place] [address], [address] [place] [address]   [place] [phone]

## 2018-09-24 ENCOUNTER — TELEPHONE (OUTPATIENT)
Dept: FAMILY MEDICINE CLINIC | Age: 63
End: 2018-09-24

## 2018-09-24 NOTE — TELEPHONE ENCOUNTER
Patient informed to schedule appt with ENT for ear symptoms and if pain persist, to go to urgent care. Patient verbalized understanding.

## 2018-09-24 NOTE — TELEPHONE ENCOUNTER
Pt missed appt today. It was at 10:00 & she thought it was at 11:30. RS form 10/5/18. Pt still has ear infection & wondering if Cipro can be resent to pharmacy. She said it is expensive & requires auth but would like it anyway.

## 2018-09-24 NOTE — TELEPHONE ENCOUNTER
LPN please call patient and encourage to schedule appointment with ENT for ear symptoms. If persistent pain needs urgent care appointment for evaluation. [facility] THE ENT  [address] [place] [address] Drive  [address] [address]   [address]   [place], [address] [place] [address]  Phone: [phone]  Fax: [phone]    Marissa Goldstein M.D.   [address], [address] [place] [address], [address] [place], [place] [address] [phone]  [place] [phone]

## 2018-10-05 ENCOUNTER — OFFICE VISIT (OUTPATIENT)
Dept: FAMILY MEDICINE CLINIC | Age: 63
End: 2018-10-05

## 2018-10-05 VITALS
TEMPERATURE: 98.2 F | HEIGHT: 68 IN | SYSTOLIC BLOOD PRESSURE: 118 MMHG | HEART RATE: 102 BPM | DIASTOLIC BLOOD PRESSURE: 85 MMHG | RESPIRATION RATE: 16 BRPM | OXYGEN SATURATION: 98 % | BODY MASS INDEX: 33.8 KG/M2 | WEIGHT: 223 LBS

## 2018-10-05 DIAGNOSIS — R56.9 SEIZURES (HCC): ICD-10-CM

## 2018-10-05 DIAGNOSIS — Z12.39 BREAST CANCER SCREENING: ICD-10-CM

## 2018-10-05 DIAGNOSIS — Z12.11 COLON CANCER SCREENING: ICD-10-CM

## 2018-10-05 DIAGNOSIS — Z01.419 WELL WOMAN EXAM: ICD-10-CM

## 2018-10-05 DIAGNOSIS — M25.562 CHRONIC PAIN OF BOTH KNEES: ICD-10-CM

## 2018-10-05 DIAGNOSIS — F31.9 BIPOLAR DEPRESSION (HCC): ICD-10-CM

## 2018-10-05 DIAGNOSIS — Z91.81 RISK FOR FALLS: ICD-10-CM

## 2018-10-05 DIAGNOSIS — E78.2 MIXED HYPERLIPIDEMIA: ICD-10-CM

## 2018-10-05 DIAGNOSIS — D36.10 SCHWANNOMA: ICD-10-CM

## 2018-10-05 DIAGNOSIS — H81.12 BENIGN PAROXYSMAL POSITIONAL VERTIGO OF LEFT EAR: ICD-10-CM

## 2018-10-05 DIAGNOSIS — F43.10 PTSD (POST-TRAUMATIC STRESS DISORDER): Chronic | ICD-10-CM

## 2018-10-05 DIAGNOSIS — Z11.59 NEED FOR HEPATITIS C SCREENING TEST: ICD-10-CM

## 2018-10-05 DIAGNOSIS — G89.29 CHRONIC PAIN OF BOTH KNEES: ICD-10-CM

## 2018-10-05 DIAGNOSIS — I10 ESSENTIAL HYPERTENSION WITH GOAL BLOOD PRESSURE LESS THAN 140/90: ICD-10-CM

## 2018-10-05 DIAGNOSIS — Z00.00 MEDICARE ANNUAL WELLNESS VISIT, SUBSEQUENT: ICD-10-CM

## 2018-10-05 DIAGNOSIS — M25.561 CHRONIC PAIN OF BOTH KNEES: ICD-10-CM

## 2018-10-05 DIAGNOSIS — R53.83 LOW ENERGY: ICD-10-CM

## 2018-10-05 DIAGNOSIS — H91.92 HEARING LOSS OF LEFT EAR, UNSPECIFIED HEARING LOSS TYPE: ICD-10-CM

## 2018-10-05 DIAGNOSIS — Z79.899 HIGH RISK MEDICATION USE: ICD-10-CM

## 2018-10-05 DIAGNOSIS — H60.332 SWIMMER'S EAR OF LEFT SIDE, UNSPECIFIED CHRONICITY: Primary | ICD-10-CM

## 2018-10-05 RX ORDER — NEOMYCIN SULFATE, POLYMYXIN B SULFATE AND HYDROCORTISONE 10; 3.5; 1 MG/ML; MG/ML; [USP'U]/ML
3 SUSPENSION/ DROPS AURICULAR (OTIC) 4 TIMES DAILY
Qty: 10 ML | Refills: 0 | Status: SHIPPED | OUTPATIENT
Start: 2018-10-05 | End: 2018-10-12

## 2018-10-05 RX ORDER — AMOXICILLIN 500 MG/1
500 CAPSULE ORAL 3 TIMES DAILY
Qty: 21 CAP | Refills: 0 | Status: SHIPPED | OUTPATIENT
Start: 2018-10-05 | End: 2018-10-12

## 2018-10-05 RX ORDER — AMOXICILLIN 500 MG/1
CAPSULE ORAL
COMMUNITY
Start: 2018-09-10 | End: 2018-10-05 | Stop reason: SDUPTHER

## 2018-10-05 RX ORDER — NEOMYCIN SULFATE, POLYMYXIN B SULFATE AND HYDROCORTISONE 10; 3.5; 1 MG/ML; MG/ML; [USP'U]/ML
SUSPENSION/ DROPS AURICULAR (OTIC)
COMMUNITY
Start: 2018-09-10 | End: 2018-10-05 | Stop reason: SDUPTHER

## 2018-10-05 NOTE — MR AVS SNAPSHOT
[address] [place] [address] [address] [address]50 Amira Yeung [address] 
[phone] Patient: Anna Nielson MRN: [id number] KNL:[id number] Visit Information Date & Time Provider Department Dept. Phone Encounter #  
 10/5/2018  1:00 PM Bernard Mcclellan  [facility] [address] [phone] [id number] Follow-up Instructions Return in about 4 weeks (around 11/2/2018) for KNEE PAIN. Upcoming Health Maintenance Date Due Hepatitis C Screening 1955 Pneumococcal 19-64 Medium Risk (1 of 1 - PPSV23) 4/26/1975 DTaP/Tdap/Td series (1 - Tdap) 4/26/1977 PAP AKA CERVICAL CYTOLOGY 4/26/1977 Shingrix Vaccine Age 50> (1 of 2) 4/26/2006 FOBT Q 1 YEAR AGE 50-75 4/26/2006 BREAST CANCER SCRN MAMMOGRAM 7/30/2016 Influenza Age 5 to Adult 8/1/2018 MEDICARE YEARLY EXAM 10/6/2019 Allergies as of 10/5/2018  Review Complete On: 10/5/2018 By: Bernard Mcclellan MD  
  
 Severity Noted Reaction Type Reactions Codeine High 03/13/2018    Anaphylaxis Morphine High 07/14/2016    Anaphylaxis Current Immunizations  Reviewed on 12/30/2017 Name Date Influenza Vaccine (Quad) PF 12/13/2016 Not reviewed this visit You Were Diagnosed With   
  
 Codes Comments Swimmer's ear of left side, unspecified chronicity    -  Primary ICD-10-CM: J90.081 ICD-9-CM: 380.12 Breast cancer screening     ICD-10-CM: Z12.31 
ICD-9-CM: V76.10 Schwannoma     ICD-10-CM: D36.10 ICD-9-CM: 215.9 Benign paroxysmal positional vertigo of left ear     ICD-10-CM: H81.12 
ICD-9-CM: 386.11 Essential hypertension with goal blood pressure less than 140/90     ICD-10-CM: I10 
ICD-9-CM: 401.9 Bipolar depression ([address]     ICD-10-CM: F31.30 ICD-9-CM: 296.50 PTSD (post-traumatic stress disorder)     ICD-10-CM: F43.10 ICD-9-CM: 309.81 Colon cancer screening     ICD-10-CM: Z12.11 ICD-9-CM: V76.51   
 Chronic pain of both knees     ICD-10-CM: M25.561, M25.562, G89.29 ICD-9-CM: 719.46, 338.29 Risk for falls     ICD-10-CM: Z91.81 
ICD-9-CM: V15.88 Hearing loss of left ear, unspecified hearing loss type     ICD-10-CM: H91.92 
ICD-9-CM: 389.9 Low energy     ICD-10-CM: R53.83 ICD-9-CM: 780.79 Seizures ([address]     ICD-10-CM: R56.9 ICD-9-CM: 780.39 High risk medication use     ICD-10-CM: Z79.899 ICD-9-CM: V58.69 Need for hepatitis C screening test     ICD-10-CM: Z11.59 
ICD-9-CM: V73.89 Well woman exam     ICD-10-CM: M35.640 ICD-9-CM: V72.31 Vitals BP Pulse Temp Resp Height(growth percentile) Weight(growth percentile) 118/85 (!) 102 98.2 °F (36.8 °C) (Oral) 16 5' 8\" (1.727 m) 223 lb (101.2 kg) SpO2 BMI OB Status Smoking Status 98% 33.91 kg/m2 Hysterectomy Current Every Day Smoker BMI and BSA Data Body Mass Index Body Surface Area  
 33.91 kg/m 2 2.2 m 2 Preferred Pharmacy Pharmacy Name Phone [facility], [address] [place] [address] [phone] Your Updated Medication List  
  
   
This list is accurate as of 10/5/18  1:56 PM.  Always use your most recent med list.  
  
  
  
  
 albuterol 90 mcg/actuation inhaler Commonly known as:  PROVENTIL HFA, VENTOLIN HFA, PROAIR HFA Take 1 Puff by inhalation every four (4) hours as needed for Wheezing. amoxicillin 500 mg capsule Commonly known as:  AMOXIL Take 1 Cap by mouth three (3) times daily for 7 days. gabapentin 400 mg capsule Commonly known as:  NEURONTIN Take 1 Cap by mouth two (2) times a day. Indications: NEUROPATHIC PAIN  
  
 neomycin-polymyxin-hydrocortisone (buffered) 3.5-10,000-1 mg/mL-unit/mL-% otic suspension Commonly known as:  Deandre Yoon Administer 3 Drops in left ear four (4) times daily for 7 days. Indications: Otitis Externa  
  
 prazosin 1 mg capsule Commonly known as:  MINIPRESS  
 Take 1 Cap by mouth nightly. Indications: CHRONIC PTSD WITH TRAUMA NIGHTMARES QUEtiapine 400 mg tablet Commonly known as:  SEROquel Take 1 Tab by mouth nightly. Indications: DEPRESSION TREATMENT ADJUNCT  
  
 rOPINIRole 1 mg tablet Commonly known as:  Holly Dines Take 1 Tab by mouth nightly. Indications: Restless Legs Syndrome Prescriptions Sent to Pharmacy Refills  
 amoxicillin (AMOXIL) 500 mg capsule 0 Sig: Take 1 Cap by mouth three (3) times daily for 7 days. Class: Normal  
 Pharmacy: [address], [address] [place] [address] Ph #: [phone] Route: Oral  
 neomycin-polymyxin-hydrocortisone, buffered, (PEDIOTIC) 3.5-10,000-1 mg/mL-unit/mL-% otic suspension 0 Sig: Administer 3 Drops in left ear four (4) times daily for 7 days. Indications: Otitis Externa Class: Normal  
 Pharmacy: [address] Ph #: [phone] Route: Left Ear We Performed the Following REFERRAL TO GYNECOLOGY [REF30 Custom] Comments:  
 Please evaluate patient for well woman exam 
 
[facility]. REFERRAL TO NEUROLOGY [LKN36 Custom] Comments:  
 Please evaluate patient for h/o vertigo and brain lesion and seizures [place]. REFERRAL TO PHYSICAL THERAPY [AFW42 Custom] Comments:  
 Orthotic assessment for fall risk/knee pain, knee strengthening as well [place] Follow-up Instructions Return in about 4 weeks (around 11/2/2018) for KNEE PAIN. To-Do List   
 10/05/2018 Lab:  CBC WITH AUTOMATED DIFF   
  
 10/05/2018 Lab:  HEMOGLOBIN A1C WITH EAG   
  
 10/05/2018 Lab:  HEPATITIS C AB   
  
 10/05/2018 Lab:  LIPID PANEL   
  
 10/05/2018 Imaging:  [facility] MAMMO BI SCREENING INCL CAD   
  
 10/05/2018 Lab:  METABOLIC PANEL, COMPREHENSIVE   
  
 10/05/2018 Lab:  OCCULT BLOOD IMMUNOASSAY,DIAGNOSTIC   
  
 10/05/2018 Lab:  TSH 3RD GENERATION   
  
 10/09/2018 Imaging:  MRI BRAIN W CONT Referral Information Referral ID Referred By Referred To  
  
 [id number] Humberto Arreguin Not Available Visits Status Start Date End Date 1 New Request 10/5/18 10/5/19 If your referral has a status of pending review or denied, additional information will be sent to support the outcome of this decision. Referral ID Referred By Referred To  
 [id number] Humberto Arreguin Not Available Visits Status Start Date End Date 1 New Request 10/5/18 10/5/19 If your referral has a status of pending review or denied, additional information will be sent to support the outcome of this decision. Referral ID Referred By Referred To  
 [id number] Humberto Arreguin Not Available Visits Status Start Date End Date 1 New Request 10/5/18 10/5/19 If your referral has a status of pending review or denied, additional information will be sent to support the outcome of this decision. Patient Instructions FOR EAR INFECTION:  
USE ANTIBIOTIC AMOXICILLIN AND EAR DROPS AS PRESCRIBED FOLLOW UP WITH ENT PER BELOW 
9:15 AM appointment 10/8/[address] [place] [address] ENT Insurance card, photo ID, list of medicines taking Dr. Jocelyn Samson [address] [place], [address] [place] [address] Phone: [phone] Return ON Friday to give blood after fasting 8 hours. No food or coffee. Water and medicines are okay. Lab opens from 8:30AM to 3PM. Schedule at . GET FLU SHOT AND PNEUMONIA SHOT AT THIS TIME. BRING COLON CANCER SCREENING TEST AT THAT TIME. We are sending a referral to NEUROLOGY, PHYSICAL THERAPY, GYNECOLOGY . You should be called about this in 3-4 weeks. If no word in 4 weeks please give us a call to follow up We are sending a referral to [facility] MAMMOGRAM AND MRI OF BRAIN . You should be called about this in 1-2 weeks. If no word in 2 weeks please give us a call to follow up RETURN IN 4 WEEKS FOR KNEE PAIN EVALUATION If you are having thoughts of harming yourself or others please report to local hospital for evaluation or call suicide hotline [phone] Schedule of Personalized Health Plan The best way to stay healthy is to live a healthy lifestyle. A healthy lifestyle includes regular exercise, eating a well-balanced diet, keeping a healthy weight and not smoking. Regular physical exams and screening tests are another important way to take care of yourself. Preventive exams provided by health care providers can find health problems early when treatment works best and can keep you from getting certain diseases or illnesses. Preventive services include exams, lab tests, screenings, shots, monitoring and information to help you take care of your own health. All people over 65 should have a pneumonia shot. Pneumonia shots are usually only needed once in a lifetime unless your doctor decides differently. All people over 65 should have a yearly flu shot. People over 65 are at medium to high risk for Hepatitis B. Three shots are needed for complete protection. In addition to your physical exam, some screening tests are recommended: 
 
 
Screening for Breast Cancer is recommended yearly with a mammogram. 
 
 Screening for Cervical Cancer is recommended every two years (annually for certain risk factors, such as previous history of STD or abnormal PAP in past 7 years), with a Pelvic Exam with PAP Here is a list of your current Health Maintenance items with a due date: 
Health Maintenance Topic Date Due  
 Hepatitis C Screening  1955  Pneumococcal 19-64 Medium Risk (1 of 1 - PPSV23) 04/26/1975  
 DTaP/Tdap/Td series (1 - Tdap) 04/26/1977  PAP AKA CERVICAL CYTOLOGY  04/26/1977  Shingrix Vaccine Age 50> (1 of 2) 04/26/2006  FOBT Q 1 YEAR AGE 50-75  04/26/2006  BREAST CANCER SCRN MAMMOGRAM  07/30/2016  Influenza Age 5 to Adult  08/01/2018  MEDICARE YEARLY EXAM  08/30/2018 Introducing [facility]! [facility] introduces [employer] patient portal. Now you can access parts of your medical record, email your doctor's office, and request medication refills online. 1. In your internet browser, go to [email or web address] 2. Click on the First Time User? Click Here link in the Sign In box. You will see the New Member Sign Up page. 3. Enter your [employer] Access Code exactly as it appears below. You will not need to use this code after youve completed the sign-up process. If you do not sign up before the expiration date, you must request a new code. · [employer] Access Code: [email or web address] Expires: 11/28/2018 12:17 PM 
 
4. Enter the last four digits of your Social Security Number (xxxx) and Date of Birth (mm/dd/yyyy) as indicated and click Submit. You will be taken to the next sign-up page. 5. Create a [employer] ID. This will be your [employer] login ID and cannot be changed, so think of one that is secure and easy to remember. 6. Create a [employer] password. You can change your password at any time. 7. Enter your Password Reset Question and Answer. This can be used at a later time if you forget your password. 8. Enter your e-mail address. You will receive e-mail notification when new information is available in [address] 9. Click Sign Up. You can now view and download portions of your medical record. 10. Click the Download Summary menu link to download a portable copy of your medical information. If you have questions, please visit the Frequently Asked Questions section of the [employer] website. Remember, [employer] is NOT to be used for urgent needs. For medical emergencies, dial 911. Now available from your iPhone and Android! Please provide this summary of care documentation to your next provider. Your primary care clinician is listed as [facility]. If you have any questions after today's visit, please call [phone].

## 2018-10-05 NOTE — PROGRESS NOTES
INTERNAL MEDICINE INITIAL PROVIDER VISIT SUBJECTIVE:  
 
Chief Complaint Patient presents with [facility] Annual Wellness Visit  Ear Pain  
  left ear HPI: 59 y.o. female with PMHx significant for bipolar depression, PTSD, obesity is here for the above chief complaint(s). Last PCP left office NP Sarai Gerardo. Left Ear Pain w/ recent history of otitis externa initially seen 8/30/2018 for this and given PO and ear drop antibiotics. Did not use ear drops as was unaware of Rx. Seen again 9/7/2018 for sx and again given abx ear drops and PO abx  ofloxacin 5 days PO which she reports she completed as prescribed. Pain since then improved with drops and pills then returns. 2 weeks ago started having fever, chills at night. No ear drainage. some swelling in ear which is better compared to prior. Urgent referral placed for ENT 9/27/2018, appointment scheduled 10/19/2018, today appointment scheduled by writer for Monday 10/8/2018 in morning. Bipolar depression/PTSD: Feels sx are improved with current medications. Followed by [facility], JUSTIN signed, f/u scheduled this month. No SI/HI. Taking medications as prescribed. Last manic eipside 12/2017 for which she went to hospital. No AVH, some PD but reports improved with medications. No access to guns, no recent thoughts of suicide. Last SA years ago. Bilateral knee Pain: chronic, recurrent falls related to \"knees give out. \"  
 
ROS: complete ROS completed positive per HPI and veins in legs, headaches, anxiety/stress, leg swelling, chronic knee pain, weight gain, low energy. Current Outpatient Prescriptions Medication Sig  
 amoxicillin (AMOXIL) 500 mg capsule Take 1 Cap by mouth three (3) times daily for 7 days.  neomycin-polymyxin-hydrocortisone, buffered, (PEDIOTIC) 3.5-10,000-1 mg/mL-unit/mL-% otic suspension Administer 3 Drops in left ear four (4) times daily for 7 days. Indications: Otitis Externa  albuterol (PROVENTIL HFA, VENTOLIN HFA, PROAIR HFA) 90 mcg/actuation inhaler Take 1 Puff by inhalation every four (4) hours as needed for Wheezing.  gabapentin (NEURONTIN) 400 mg capsule Take 1 Cap by mouth two (2) times a day. Indications: NEUROPATHIC PAIN  prazosin (MINIPRESS) 1 mg capsule Take 1 Cap by mouth nightly. Indications: CHRONIC PTSD WITH TRAUMA NIGHTMARES  
 QUEtiapine (SEROQUEL) 400 mg tablet Take 1 Tab by mouth nightly. Indications: DEPRESSION TREATMENT ADJUNCT  
 rOPINIRole (REQUIP) 1 mg tablet Take 1 Tab by mouth nightly. Indications: Restless Legs Syndrome (Patient taking differently: Take 2 mg by mouth nightly. Indications: Restless Legs Syndrome) No current facility-administered medications for this visit. Health Maintenance Topic Date Due  
 Hepatitis C Screening  1955  Pneumococcal 19-64 Medium Risk (1 of 1 - PPSV23) 04/26/1975  
 DTaP/Tdap/Td series (1 - Tdap) 04/26/1977  PAP AKA CERVICAL CYTOLOGY  04/26/1977  Shingrix Vaccine Age 50> (1 of 2) 04/26/2006  FOBT Q 1 YEAR AGE 50-75  04/26/2006  BREAST CANCER SCRN MAMMOGRAM  07/30/2016  Influenza Age 5 to Adult  08/01/2018  MEDICARE YEARLY EXAM  10/06/2019 Medications and Allergies: Reviewed and confirmed in the chart Past Medical Hx: Reviewed and confirmed in the chart Past Medical History:  
Diagnosis Date  Adrenal adenoma, right  Arthritis  Bipolar depression ([facility] [address] 07/14/2016  Chronic kidney disease  Degenerative arthritis of knee, bilateral   
 Depression  H/O renal calculi   
 History of low potassium  Shannan's syndrome  Hypertension  Obesity (BMI 30.0-34. 9)  PTSD (post-traumatic stress disorder) 12/28/2017  Restless leg syndrome  Schwannoma  Seizures ([address] Patient Active Problem List  
Diagnosis Code  Essential hypertension with goal blood pressure less than 140/90 I10  Schwannoma D36.10  Chronic pain syndrome G89.4  Benign paroxysmal positional vertigo of left ear H81.12  
 PTSD (post-traumatic stress disorder) F43.10  Restless leg syndrome G25.81  
 Hypertension I10  
 H/O renal calculi Z87.442  Adrenal adenoma, right D35.01  
 Bipolar depression ([facility] [address] F31.30  Obesity (BMI 30.0-34. 9) E66.9  
 History of low potassium Z86.39  
 Shannan's syndrome G90.2  Degenerative arthritis of knee, bilateral M17.0  Risk for falls Z91.81  
 Seizures ([facility] [address] R56.9  Mixed hyperlipidemia E78.2 Family Hx, Surgical Hx, Social Hx: Reviewed and updated in EMR OBJECTIVE: 
Vitals:  
 10/05/18 1312 BP: 118/85 Pulse: (!) 102 Resp: 16 Temp: 98.2 °F (36.8 °C) TempSrc: Oral  
SpO2: 98% Weight: 223 lb (101.2 kg) Height: 5' 8\" (1.727 m) BP Readings from Last 3 Encounters:  
10/05/18 118/85  
09/07/18 112/83  
08/30/18 (!) 139/96 Wt Readings from Last 3 Encounters:  
10/05/18 223 lb (101.2 kg) 09/07/18 217 lb 3.2 oz (98.5 kg) 08/30/18 211 lb 6.4 oz (95.9 kg) General: Pleasant adult woman sitting comfortably in no acute distress HEENT: Head is atraumatic normo-cephalic. Pupils equally round and reactive to light, extraocular muscle intact, conjunctiva clear, non-icterus. Ears right TM in LR intact. Left ear TTP tragus and visible swelling. Ear canal edema partially but not completely occluded with whitish discharge internal canal, unable to fully visualzie TM given swelling. Pain with manipulation of ear. Oral mucosa moist without erythema, ulceration. Neck: Supple, no LAD, no palpable thyromegaly. CVS: Heart regular, rate, and rhythm. Audible S1 and S2. No murmurs, rubs or gallops. PULM: Lungs clear to auscultation bilaterally. No wheezes, rales or rhonchi. Neuro: Alert and oriented x 3. Gait arthralgic. MSE: mood euthymic, affect congruent and reactive. No SI/HI. Fair insight and judgment. RESULTS: 
Lab Results Component Value Date/Time WBC 3.8 (L) 03/13/2018 11:29 AM  
 HGB 11.6 (L) 03/13/2018 11:29 AM  
 HCT 36.2 03/13/2018 11:29 AM  
 PLATELET 255 (H) 09/58/2999 11:29 AM  
 
Lab Results Component Value Date/Time Sodium 137 03/13/2018 11:29 AM  
 Potassium 4.3 03/13/2018 11:29 AM  
 Chloride 107 03/13/2018 11:29 AM  
 CO2 21 03/13/2018 11:29 AM  
 Glucose 96 03/13/2018 11:29 AM  
 BUN 15 03/13/2018 11:29 AM  
 Creatinine 0.76 03/13/2018 11:29 AM  
 
Lab Results Component Value Date/Time Cholesterol, total 219 (H) 07/14/2016 02:17 PM  
 HDL Cholesterol 82 (H) 07/14/2016 02:17 PM  
 LDL, calculated 123.2 (H) 07/14/2016 02:17 PM  
 Triglyceride 69 07/14/2016 02:17 PM  
 
 
Results MRI BRAIN W WO CONT (Accession [id number]) (Order [id number]) Allergies     
High: Codeine;  Morphine Result Information Status Provider Status Final result (Exam End: 9/14/2017  1:24 PM) Reviewed Result Notes Notes Recorded by Abdelrahman Correa NP on 9/14/2017 at 2:54 PM 
Please let pt know, MRI with 2 findings. Anali Gerardo is small lesion that will be discussed at upcoming neuro appointment with Dr Alvarez Rodriguez. Jana Hill noted she has likely middle ear infection.  I will send in amoxicillin 500 mg, she should take twice a day for 10 days.  Follow up with neuro as planned and schedule with me after she sees neuro.  
  
   
Study Result INDICATION: vertigo with hx of schwannoma. , History of schwannoma, vertigo COMPARISON: None. TECHNIQUE: Multiplanar, multisequence MRI brain and IACs without and with IV 
contrast. 
  
FINDINGS: 
  
IACs:  
There is an enhancing lesion in the left CP angle cistern measuring 1.5 cm seen 
on image 6 series 11. Lesion does not extend into the IAC. Main differential 
considerations would include meningioma versus schwannoma. Meningioma is favored 
given dural tails and location of the lesion. Dural metastasis and lymphoma are 
less likely differential considerations. No other areas of signal abnormality or enhancement involving the brainstem, CP angle cisterns, IACs, or labyrinths. Moderate fluid in the right mastoid air cells. 
  
Brain: 
Brain volume is within normal limits for age. There are a few scattered foci of 
chronic small vessel ischemic changes, not unusual for patient age. Elsewhere, 
there is no brain parenchymal mass, hemorrhage, acute ischemia, or 
hydrocephalus. The sella, pineal region, and craniocervical junction are 
unremarkable. Skull base flow voids are patent. Postcontrast images demonstrate 
no areas of abnormal brain parenchymal enhancement.     
  
IMPRESSION IMPRESSION: 
  
1.  Enhancing lesion in the left CP angle cistern as described above. Main 
differential considerations would include meningioma versus schwannoma. Dural 
metastasis and lymphoma are less likely differential considerations. Surveillance or further evaluation is recommended. 
  
2. Moderate right mastoid effusion. 
  
3. Chronic age-related changes. Nursing Notes Reviewed ASSESSMENT AND PLAN 
  ICD-10-CM ICD-9-CM 1. Swimmer's ear of left side, unspecified chronicity H60.332 380.12 amoxicillin (AMOXIL) 500 mg capsule  
   neomycin-polymyxin-hydrocortisone, buffered, (PEDIOTIC) 3.5-10,000-1 mg/mL-unit/mL-% otic suspension ENT appointment made for 10/8/2018 9:15 am, pt given information encouraged to attend 2. Breast cancer screening Z12.31 V76.10 JAIMEE MAMMO BI SCREENING INCL CAD 3. Hearing loss of left ear, unspecified hearing loss type H91.92 389.9 Likely related to otitis externa, recheck after infection clears 4. Schwannoma D36.10 215.9 REFERRAL TO NEUROLOGY  
   MRI BRAIN W CONT 5. Benign paroxysmal positional vertigo of left ear H81.12 386.11 REFERRAL TO NEUROLOGY 6. Seizures ([address] R56.9 780.39 REFERRAL TO NEUROLOGY No driving for 6 months after seizure 7. Chronic pain of both knees M25.561 719.46 REFERRAL TO PHYSICAL THERAPY  
 M25.562 338.29  G89.29    
 8. Risk for falls Z91.81 V15.88 REFERRAL TO PHYSICAL THERAPY 9. Essential hypertension with goal blood pressure less than 140/90 I10 401.9 CBC WITH AUTOMATED DIFF  
   TSH 3RD GENERATION  
   METABOLIC PANEL, COMPREHENSIVE 10. Bipolar depression ([facility] [address]): stable, low risk SI F31.30 296.50 Continue current mgmt F/u with psychiatry 11. PTSD (post-traumatic stress disorder) F43.10 309.81 Continue current mgmt F/u with psychiatry 12. Colon cancer screening Z12.11 V76.51 OCCULT BLOOD IMMUNOASSAY,DIAGNOSTIC 13. Low energy R53.83 780.79 CBC WITH AUTOMATED DIFF  
   TSH 3RD GENERATION  
   METABOLIC PANEL, COMPREHENSIVE 14. High risk medication use Z79.899 V58.69 LIPID PANEL  
   HEMOGLOBIN A1C WITH EAG  
15. Need for hepatitis C screening test Z11.59 V73.89 HEPATITIS C AB  
12. Well woman exam Z01.419 V72.31 REFERRAL TO GYNECOLOGY 17. Mixed hyperlipidemia E78.2 272.2 LIPID PANEL Orders Placed This Encounter  JAIMEE MAMMO BI SCREENING INCL CAD  MRI BRAIN W CONT  
 OCCULT BLOOD IMMUNOASSAY,DIAGNOSTIC  
 HEPATITIS C AB  
 LIPID PANEL  
 HEMOGLOBIN A1C WITH EAG  
 CBC WITH AUTOMATED DIFF  
 TSH 3RD GENERATION  
 METABOLIC PANEL, COMPREHENSIVE  
 REFERRAL TO GYNECOLOGY  REFERRAL TO PHYSICAL THERAPY  REFERRAL TO NEUROLOGY  DISCONTD: neomycin-polymyxin-hydrocortisone, buffered, (PEDIOTIC) 3.5-10,000-1 mg/mL-unit/mL-% otic suspension  DISCONTD: amoxicillin (AMOXIL) 500 mg capsule  amoxicillin (AMOXIL) 500 mg capsule  neomycin-polymyxin-hydrocortisone, buffered, (PEDIOTIC) 3.5-10,000-1 mg/mL-unit/mL-% otic suspension Future Appointments Date Time Provider Juan Green 11/2/2018 1:30 PM Roseline Neal MD [address] [place] [address] printed and provided to patient Assessment and plan above discussed with patient, patient voiced understanding and agreement with plan.  
 
More than 50% of this 60 min visit was spent face to face counseling the patient about the etiology and treatment options for the above health conditions outlined in assessment and plan Daniel Field M.D. [employer] [address] [facility], [address] [place], [address] [place] [address] [phone] Fax - [phone] MEDICARE WELLNESS NOTE BELOW Aiden Avelar is a 59 y.o. female and presents for annual Medicare Wellness Visit. Problem List: Reviewed with patient and discussed risk factors. Patient Active Problem List  
Diagnosis Code  Essential hypertension with goal blood pressure less than 140/90 I10  Schwannoma D36.10  Chronic pain syndrome G89.4  Benign paroxysmal positional vertigo of left ear H81.12  
 PTSD (post-traumatic stress disorder) F43.10  Restless leg syndrome G25.81  
 Hypertension I10  
 H/O renal calculi Z87.442  Adrenal adenoma, right D35.01  
 Bipolar depression ([address] F31.30  Obesity (BMI 30.0-34. 9) E66.9  
 History of low potassium Z86.39  
 Shannan's syndrome G90.2  Degenerative arthritis of knee, bilateral M17.0  Risk for falls Z91.81  
 Seizures ([address] R56.9  Mixed hyperlipidemia E78.2 Current medical providers:  Patient Care Team: 
Daniel Field MD as PCP - General (Internal Medicine) [facility] PSH: Reviewed with patient Past Surgical History:  
Procedure Laterality Date  HX HYSTERECTOMY    
 fibroids  HX OTHER SURGICAL    
 pancoast tumor removed 3 years ago per patient 2018 SH: Reviewed with patient Social History Substance Use Topics  Smoking status: Current Every Day Smoker Packs/day: 0.50 Types: Cigarettes  Smokeless tobacco: Never Used Comment: smoking since 27 y/o,  Alcohol use No  
 
 
FH: Reviewed with patient Family History Problem Relation Age of Onset  Dizziness Father  Breast Cancer Neg Hx  Colon Cancer Neg Hx Medications/Allergies: Reviewed with patient Current Outpatient Prescriptions on File Prior to Visit Medication Sig Dispense Refill  albuterol (PROVENTIL HFA, VENTOLIN HFA, PROAIR HFA) 90 mcg/actuation inhaler Take 1 Puff by inhalation every four (4) hours as needed for Wheezing. 1 Inhaler 2  
 gabapentin (NEURONTIN) 400 mg capsule Take 1 Cap by mouth two (2) times a day. Indications: NEUROPATHIC PAIN 60 Cap 0  
 prazosin (MINIPRESS) 1 mg capsule Take 1 Cap by mouth nightly. Indications: CHRONIC PTSD WITH TRAUMA NIGHTMARES 30 Cap 0  
 QUEtiapine (SEROQUEL) 400 mg tablet Take 1 Tab by mouth nightly. Indications: DEPRESSION TREATMENT ADJUNCT 30 Tab 0  
 rOPINIRole (REQUIP) 1 mg tablet Take 1 Tab by mouth nightly. Indications: Restless Legs Syndrome (Patient taking differently: Take 2 mg by mouth nightly. Indications: Restless Legs Syndrome) 30 Tab 0 No current facility-administered medications on file prior to visit. Allergies Allergen Reactions  Codeine Anaphylaxis  Morphine Anaphylaxis Objective: 
Visit Vitals  /85  Pulse (!) 102  Temp 98.2 °F (36.8 °C) (Oral)  Resp 16  
 Ht 5' 8\" (1.727 m)  Wt 223 lb (101.2 kg)  SpO2 98%  BMI 33.91 kg/m2 Body mass index is 33.91 kg/(m^2). Physical Exam: Sitting comfortably, no acute distress. Assessment of cognitive impairment: Alert and oriented x month, day of week, year, person, situation MMSE/MOCA: N/A Depression Screen: PHQ over the last two weeks 10/5/2018 PHQ Not Done - Little interest or pleasure in doing things Nearly every day Feeling down, depressed, irritable, or hopeless More than half the days Total Score PHQ 2 5 Trouble falling or staying asleep, or sleeping too much More than half the days Feeling tired or having little energy More than half the days Poor appetite, weight loss, or overeating Nearly every day Feeling bad about yourself - or that you are a failure or have let yourself or your family down More than half the days Trouble concentrating on things such as school, work, reading, or watching TV More than half the days Moving or speaking so slowly that other people could have noticed; or the opposite being so fidgety that others notice More than half the days Thoughts of being better off dead, or hurting yourself in some way Not at all PHQ 9 Score 18 How difficult have these problems made it for you to do your work, take care of your home and get along with others Somewhat difficult Fall Risk Assessment:   
Fall Risk Assessment, last 12 mths 10/5/2018 Able to walk? Yes Fall in past 12 months? Yes Fall with injury? Yes  
Number of falls in past 12 months 6 Fall Risk Score 7 Functional Ability:  
Does the patient exhibit a steady gait? yes How long did it take the patient to get up and walk from a sitting position? <3 seconds Is the patient self reliant?  (ie can do own laundry, meals, household chores)  yes Does the patient handle his/her own medications? yes Does the patient handle his/her own money? yes Is the patients home safe (ie good lighting, handrails on stairs and bath, etc.)? yes Did you notice or did patient express any hearing difficulties? Yes, left ear hearing loss Did you notice or did patient express any vision difficulties? Yes, wears glasses Were distance and reading eye charts used? yes Advance Care Planning:  
Patient was offered the opportunity to discuss advance care planning:  yes Does patient have an Advance Directive:  yes If no, did you provide information on Caring Connections?  no  
 
 
Plan:   
 
Orders Placed This Encounter  JAIMEE MAMMO BI SCREENING INCL CAD  MRI BRAIN W CONT  
 OCCULT BLOOD IMMUNOASSAY,DIAGNOSTIC  
 HEPATITIS C AB  
 LIPID PANEL  
 HEMOGLOBIN A1C WITH EAG  
 CBC WITH AUTOMATED DIFF  
 TSH 3RD GENERATION  
 METABOLIC PANEL, COMPREHENSIVE  
 REFERRAL TO GYNECOLOGY  REFERRAL TO PHYSICAL THERAPY  REFERRAL TO NEUROLOGY  DISCONTD: neomycin-polymyxin-hydrocortisone, buffered, (PEDIOTIC) 3.5-10,000-1 mg/mL-unit/mL-% otic suspension  DISCONTD: amoxicillin (AMOXIL) 500 mg capsule  amoxicillin (AMOXIL) 500 mg capsule  neomycin-polymyxin-hydrocortisone, buffered, (PEDIOTIC) 3.5-10,000-1 mg/mL-unit/mL-% otic suspension Health Maintenance Topic Date Due  
 Hepatitis C Screening  1955  Pneumococcal 19-64 Medium Risk (1 of 1 - PPSV23) 04/26/1975  
 DTaP/Tdap/Td series (1 - Tdap) 04/26/1977  PAP AKA CERVICAL CYTOLOGY  04/26/1977  Shingrix Vaccine Age 50> (1 of 2) 04/26/2006  FOBT Q 1 YEAR AGE 50-75  04/26/2006  BREAST CANCER SCRN MAMMOGRAM  07/30/2016  Influenza Age 5 to Adult  08/01/2018  MEDICARE YEARLY EXAM  10/06/2019 *Patient verbalized understanding and agreement with the plan. A copy of the After Visit Summary with personalized health plan was given to the patient today.

## 2018-10-05 NOTE — PROGRESS NOTES
Chief Complaint Patient presents with [facility] Establish Care [facility] Annual Wellness Visit  Ear Pain  
  left ear 1. Have you been to the ER, urgent care clinic since your last visit? Hospitalized since your last visit? [facility]-ear pain 2. Have you seen or consulted any other health care providers outside of the [address] [place] [address] since your last visit? Include any pap smears or colon screening.  No

## 2018-10-05 NOTE — ACP (ADVANCE CARE PLANNING)
Advance Care Planning (ACP) Provider Note - Comprehensive     Date of ACP Conversation: 10/05/18  Persons included in Conversation:  patient  Length of ACP Conversation in minutes:  <16 minutes (Non-Billable)    Authorized Decision Maker (if patient is incapable of making informed decisions): This person is:  N/A          General ACP for ALL Patients with Decision Making Capacity:   Importance of advance care planning, including choosing a healthcare agent to communicate patient's healthcare decisions if patient lost the ability to make decisions, such as after a sudden illness or accident    Review of Existing Advance Directive:  Does this advance directive still reflect your preferences?   Yes (Provide new form/Refer for assistance in updating)    For Serious or Chronic Illness:  Understanding of medical condition      Interventions Provided:  Recommended review of completed ACP document annually or upon change in health status

## 2018-10-05 NOTE — PATIENT INSTRUCTIONS
FOR EAR INFECTION:  
USE ANTIBIOTIC AMOXICILLIN AND EAR DROPS AS PRESCRIBED FOLLOW UP WITH ENT PER BELOW 
9:15 AM appointment 10/8/2018 [id number] [facility] ENT Insurance card, photo ID, list of medicines taking Dr. Stanislaw Reed [address] [place], [address] Phone: [phone] Return ON Friday to give blood after fasting 8 hours. No food or coffee. Water and medicines are okay. Lab opens from 8:30AM to 3PM. Schedule at . GET FLU SHOT AND PNEUMONIA SHOT AT THIS TIME. BRING COLON CANCER SCREENING TEST AT THAT TIME. We are sending a referral to NEUROLOGY, PHYSICAL THERAPY, GYNECOLOGY . You should be called about this in 3-4 weeks. If no word in 4 weeks please give us a call to follow up We are sending a referral to [facility] MAMMOGRAM AND MRI OF BRAIN . You should be called about this in 1-2 weeks. If no word in 2 weeks please give us a call to follow up RETURN IN 4 WEEKS FOR KNEE PAIN EVALUATION If you are having thoughts of harming yourself or others please report to local hospital for evaluation or call suicide hotline [phone] Schedule of Personalized Health Plan The best way to stay healthy is to live a healthy lifestyle. A healthy lifestyle includes regular exercise, eating a well-balanced diet, keeping a healthy weight and not smoking. Regular physical exams and screening tests are another important way to take care of yourself. Preventive exams provided by health care providers can find health problems early when treatment works best and can keep you from getting certain diseases or illnesses. Preventive services include exams, lab tests, screenings, shots, monitoring and information to help you take care of your own health. All people over 65 should have a pneumonia shot. Pneumonia shots are usually only needed once in a lifetime unless your doctor decides differently. All people over 65 should have a yearly flu shot. People over 65 are at medium to high risk for Hepatitis B. Three shots are needed for complete protection. In addition to your physical exam, some screening tests are recommended: 
 
Bone mass measurement (dexa scan) is recommended every two years Diabetes Mellitus screening is recommended every year. Glaucoma is an eye disease caused by high pressure in the eye. An eye exam is recommended every year. Cardiovascular screening tests that check your cholesterol and other blood fat (lipid) levels are recommended every five years. Colorectal Cancer screening tests help to find pre-cancerous polyps (growths in the colon) so they can be removed before they turn into cancer. Tests ordered for screening depend on your personal and family history risk factors. Screening for Breast Cancer is recommended yearly with a mammogram. 
 
Screening for Cervical Cancer is recommended every two years (annually for certain risk factors, such as previous history of STD or abnormal PAP in past 7 years), with a Pelvic Exam with PAP Here is a list of your current Health Maintenance items with a due date: 
Health Maintenance Topic Date Due  
 Hepatitis C Screening  1955  Pneumococcal 19-64 Medium Risk (1 of 1 - PPSV23) 04/26/1975  
 DTaP/Tdap/Td series (1 - Tdap) 04/26/1977  PAP AKA CERVICAL CYTOLOGY  04/26/1977  Shingrix Vaccine Age 50> (1 of 2) 04/26/2006  FOBT Q 1 YEAR AGE 50-75  04/26/2006  BREAST CANCER SCRN MAMMOGRAM  07/30/2016  Influenza Age 5 to Adult  08/01/2018  MEDICARE YEARLY EXAM  08/30/2018

## 2018-10-08 ENCOUNTER — DOCUMENTATION ONLY (OUTPATIENT)
Dept: FAMILY MEDICINE CLINIC | Age: 63
End: 2018-10-08

## 2018-10-08 NOTE — PROGRESS NOTES
Order for mammogram and brain MRI received and faxed to [facility]. Fax confirmation shows it was received by [facility].

## 2018-10-16 ENCOUNTER — TELEPHONE (OUTPATIENT)
Dept: FAMILY MEDICINE CLINIC | Age: 63
End: 2018-10-16

## 2018-10-16 NOTE — TELEPHONE ENCOUNTER
[facility] with [address] states received referral for pt. States on referral states for well woman exam. Pt states per pt's insurance medicare will not cover routine exam. States would have to be done by pcp. States will cover if pt was referred to facility for a problem only.

## 2018-10-16 NOTE — TELEPHONE ENCOUNTER
LPN please call patient and inform needs to schedule pap smear with Dr. Mahin Butcher per insurance. Nell Ewing M.D.   [employer]  [address] [place] [address], [address] [place] [address] [phone]  [facility] - [phone]  [phone]

## 2018-10-17 ENCOUNTER — APPOINTMENT (OUTPATIENT)
Dept: PHYSICAL THERAPY | Age: 63
End: 2018-10-17

## 2018-10-19 ENCOUNTER — LAB ONLY (OUTPATIENT)
Dept: FAMILY MEDICINE CLINIC | Age: 63
End: 2018-10-19

## 2018-10-19 ENCOUNTER — HOSPITAL ENCOUNTER (OUTPATIENT)
Dept: LAB | Age: 63
Discharge: HOME | End: 2018-10-19
Payer: MEDICARE

## 2018-10-19 DIAGNOSIS — Z79.899 HIGH RISK MEDICATION USE: ICD-10-CM

## 2018-10-19 DIAGNOSIS — I10 ESSENTIAL HYPERTENSION WITH GOAL BLOOD PRESSURE LESS THAN 140/90: ICD-10-CM

## 2018-10-19 DIAGNOSIS — E78.2 MIXED HYPERLIPIDEMIA: ICD-10-CM

## 2018-10-19 DIAGNOSIS — Z23 ENCOUNTER FOR IMMUNIZATION: Primary | ICD-10-CM

## 2018-10-19 DIAGNOSIS — R53.83 LOW ENERGY: ICD-10-CM

## 2018-10-19 DIAGNOSIS — Z11.59 NEED FOR HEPATITIS C SCREENING TEST: ICD-10-CM

## 2018-10-19 LAB
ALBUMIN SERPL-MCNC: 3.8 G/DL (ref 3.4–5)
ALBUMIN/GLOB SERPL: 1.5 {RATIO} (ref 0.8–1.7)
ALP SERPL-CCNC: 77 U/L (ref 45–117)
ALT SERPL-CCNC: 20 U/L (ref 13–56)
ANION GAP SERPL CALC-SCNC: 6 MMOL/L (ref 3–18)
AST SERPL-CCNC: 13 U/L (ref 15–37)
BASOPHILS # BLD: 0 K/UL (ref 0–0.1)
BASOPHILS NFR BLD: 0 % (ref 0–2)
BILIRUB SERPL-MCNC: 0.3 MG/DL (ref 0.2–1)
BUN SERPL-MCNC: 20 MG/DL (ref 7–18)
BUN/CREAT SERPL: 23 (ref 12–20)
CALCIUM SERPL-MCNC: 9.2 MG/DL (ref 8.5–10.1)
CHLORIDE SERPL-SCNC: 108 MMOL/L (ref 100–108)
CHOLEST SERPL-MCNC: 194 MG/DL
CO2 SERPL-SCNC: 27 MMOL/L (ref 21–32)
CREAT SERPL-MCNC: 0.87 MG/DL (ref 0.6–1.3)
DIFFERENTIAL METHOD BLD: ABNORMAL
EOSINOPHIL # BLD: 0.3 K/UL (ref 0–0.4)
EOSINOPHIL NFR BLD: 6 % (ref 0–5)
ERYTHROCYTE [DISTWIDTH] IN BLOOD BY AUTOMATED COUNT: 15.1 % (ref 11.6–14.5)
EST. AVERAGE GLUCOSE BLD GHB EST-MCNC: 100 MG/DL
GLOBULIN SER CALC-MCNC: 2.6 G/DL (ref 2–4)
GLUCOSE SERPL-MCNC: 79 MG/DL (ref 74–99)
HBA1C MFR BLD: 5.1 % (ref 4.2–5.6)
HCT VFR BLD AUTO: 38.4 % (ref 35–45)
HDLC SERPL-MCNC: 78 MG/DL (ref 40–60)
HDLC SERPL: 2.5 {RATIO} (ref 0–5)
HGB BLD-MCNC: 12.2 G/DL (ref 12–16)
LDLC SERPL CALC-MCNC: 103.4 MG/DL (ref 0–100)
LIPID PROFILE,FLP: ABNORMAL
LYMPHOCYTES # BLD: 1.7 K/UL (ref 0.9–3.6)
LYMPHOCYTES NFR BLD: 39 % (ref 21–52)
MCH RBC QN AUTO: 29.3 PG (ref 24–34)
MCHC RBC AUTO-ENTMCNC: 31.8 G/DL (ref 31–37)
MCV RBC AUTO: 92.3 FL (ref 74–97)
MONOCYTES # BLD: 0.4 K/UL (ref 0.05–1.2)
MONOCYTES NFR BLD: 8 % (ref 3–10)
NEUTS SEG # BLD: 2.1 K/UL (ref 1.8–8)
NEUTS SEG NFR BLD: 47 % (ref 40–73)
PLATELET # BLD AUTO: 311 K/UL (ref 135–420)
PMV BLD AUTO: 10.1 FL (ref 9.2–11.8)
POTASSIUM SERPL-SCNC: 4.3 MMOL/L (ref 3.5–5.5)
PROT SERPL-MCNC: 6.4 G/DL (ref 6.4–8.2)
RBC # BLD AUTO: 4.16 M/UL (ref 4.2–5.3)
SODIUM SERPL-SCNC: 141 MMOL/L (ref 136–145)
TRIGL SERPL-MCNC: 63 MG/DL (ref ?–150)
TSH SERPL DL<=0.05 MIU/L-ACNC: 3.2 UIU/ML (ref 0.36–3.74)
VLDLC SERPL CALC-MCNC: 12.6 MG/DL
WBC # BLD AUTO: 4.5 K/UL (ref 4.6–13.2)

## 2018-10-19 PROCEDURE — 80053 COMPREHEN METABOLIC PANEL: CPT | Performed by: INTERNAL MEDICINE

## 2018-10-19 PROCEDURE — 80061 LIPID PANEL: CPT | Performed by: INTERNAL MEDICINE

## 2018-10-19 PROCEDURE — 85025 COMPLETE CBC W/AUTO DIFF WBC: CPT | Performed by: INTERNAL MEDICINE

## 2018-10-19 PROCEDURE — 83036 HEMOGLOBIN GLYCOSYLATED A1C: CPT | Performed by: INTERNAL MEDICINE

## 2018-10-19 PROCEDURE — 84443 ASSAY THYROID STIM HORMONE: CPT | Performed by: INTERNAL MEDICINE

## 2018-10-19 PROCEDURE — 86803 HEPATITIS C AB TEST: CPT | Performed by: INTERNAL MEDICINE

## 2018-10-22 LAB
HCV AB SER IA-ACNC: <0.02 INDEX
HCV AB SERPL QL IA: NEGATIVE
HCV COMMENT,HCGAC: NORMAL

## 2018-10-23 NOTE — TELEPHONE ENCOUNTER
Patient informed that, per insurance company, patient must schedule pap appt with Dr Leonardo Villanueva. Patient verbalized understanding.

## 2018-11-08 PROBLEM — F31.81 BIPOLAR 2 DISORDER (HCC): Status: ACTIVE | Noted: 2018-11-08

## 2018-11-21 ENCOUNTER — APPOINTMENT (OUTPATIENT)
Dept: PHYSICAL THERAPY | Age: 63
End: 2018-11-21

## 2018-12-12 NOTE — PROGRESS NOTES
Shayy Johnson is a 59 y.o. female who presents for routine immunizations. She denies any symptoms , reactions or allergies that would exclude them from being immunized today. Risks and adverse reactions were discussed and the VIS was given to them. All questions were addressed. She was observed for 15 min post injection. There were no reactions observed.     Lise Aguilar LPN

## 2019-01-10 ENCOUNTER — HOSPITAL ENCOUNTER (OUTPATIENT)
Dept: LAB | Age: 64
Discharge: HOME | End: 2019-01-10
Payer: MEDICARE

## 2019-01-10 ENCOUNTER — OFFICE VISIT (OUTPATIENT)
Dept: FAMILY MEDICINE CLINIC | Age: 64
End: 2019-01-10

## 2019-01-10 VITALS
RESPIRATION RATE: 16 BRPM | DIASTOLIC BLOOD PRESSURE: 97 MMHG | WEIGHT: 238.6 LBS | HEART RATE: 121 BPM | HEIGHT: 68 IN | TEMPERATURE: 96.3 F | BODY MASS INDEX: 36.16 KG/M2 | SYSTOLIC BLOOD PRESSURE: 153 MMHG | OXYGEN SATURATION: 96 %

## 2019-01-10 DIAGNOSIS — N39.0 URINARY TRACT INFECTION WITHOUT HEMATURIA, SITE UNSPECIFIED: ICD-10-CM

## 2019-01-10 DIAGNOSIS — G89.29 CHRONIC PAIN OF LEFT KNEE: Primary | ICD-10-CM

## 2019-01-10 DIAGNOSIS — Z12.31 SCREENING MAMMOGRAM, ENCOUNTER FOR: ICD-10-CM

## 2019-01-10 DIAGNOSIS — Z12.11 ENCOUNTER FOR SCREENING COLONOSCOPY FOR NON-HIGH-RISK PATIENT: ICD-10-CM

## 2019-01-10 DIAGNOSIS — M25.562 CHRONIC PAIN OF LEFT KNEE: Primary | ICD-10-CM

## 2019-01-10 DIAGNOSIS — I10 ESSENTIAL HYPERTENSION WITH GOAL BLOOD PRESSURE LESS THAN 140/90: ICD-10-CM

## 2019-01-10 DIAGNOSIS — K59.00 CONSTIPATION, UNSPECIFIED CONSTIPATION TYPE: ICD-10-CM

## 2019-01-10 PROBLEM — E66.01 SEVERE OBESITY (HCC): Status: ACTIVE | Noted: 2019-01-10

## 2019-01-10 LAB
APPEARANCE UR: CLEAR
BACTERIA URNS QL MICRO: ABNORMAL /HPF
BILIRUB UR QL STRIP: NEGATIVE
BILIRUB UR QL: NEGATIVE
COLOR UR: YELLOW
EPITH CASTS URNS QL MICRO: ABNORMAL /LPF (ref 0–5)
GLUCOSE UR STRIP.AUTO-MCNC: NEGATIVE MG/DL
GLUCOSE UR-MCNC: NEGATIVE MG/DL
HGB UR QL STRIP: NEGATIVE
KETONES P FAST UR STRIP-MCNC: NEGATIVE MG/DL
KETONES UR QL STRIP.AUTO: NEGATIVE MG/DL
LEUKOCYTE ESTERASE UR QL STRIP.AUTO: ABNORMAL
NITRITE UR QL STRIP.AUTO: NEGATIVE
PH UR STRIP: 6 [PH] (ref 4.6–8)
PH UR STRIP: 6 [PH] (ref 5–8)
PROT UR QL STRIP: NEGATIVE
PROT UR STRIP-MCNC: NEGATIVE MG/DL
RBC #/AREA URNS HPF: ABNORMAL /HPF (ref 0–5)
SP GR UR REFRACTOMETRY: 1.02 (ref 1–1.03)
SP GR UR STRIP: 1.02 (ref 1–1.03)
UA UROBILINOGEN AMB POC: ABNORMAL (ref 0.2–1)
URINALYSIS CLARITY POC: CLEAR
URINALYSIS COLOR POC: ABNORMAL
URINE BLOOD POC: NEGATIVE
URINE LEUKOCYTES POC: ABNORMAL
URINE NITRITES POC: NEGATIVE
UROBILINOGEN UR QL STRIP.AUTO: 0.2 EU/DL (ref 0.2–1)
WBC URNS QL MICRO: ABNORMAL /HPF (ref 0–4)

## 2019-01-10 PROCEDURE — 81001 URINALYSIS AUTO W/SCOPE: CPT

## 2019-01-10 RX ORDER — POLYETHYLENE GLYCOL 3350 17 G/17G
17 POWDER, FOR SOLUTION ORAL DAILY
Qty: 238 G | Refills: 1 | Status: SHIPPED | OUTPATIENT
Start: 2019-01-10

## 2019-01-10 RX ORDER — PRAZOSIN HYDROCHLORIDE 1 MG/1
1 CAPSULE ORAL
Qty: 90 CAP | Refills: 1 | Status: SHIPPED | OUTPATIENT
Start: 2019-01-10 | End: 2019-08-09

## 2019-01-10 NOTE — PROGRESS NOTES
Chief Complaint   Patient presents with   [facility] Follow Up     Seen in [facility] ED for UTI on 1-1-19    Knee Pain     left knee     1. Have you been to the ER, urgent care clinic since your last visit? Hospitalized since your last visit? [facility] 1-1-19 UTI    2. Have you seen or consulted any other health care providers outside of the [address] [place] [address] since your last visit? Include any pap smears or colon screening.  No

## 2019-01-10 NOTE — PROGRESS NOTES
HISTORY OF PRESENT ILLNESS  Delmer Anne is a 59 y.o. female here today to establish care. Patient also c/o ongoing constipation. She also recently went to the ED for UTI. She states her symptoms have resolved. Would like to test for cure today. Seizures:  -Patient states that last seizure was 4 months ago. Patient states that she has never talked with neurologist about seizures. Advised to discuss with neurology at next visit. Asthma  Patient uses albuterol sparingly, well controlled    Left arm pain/paresthesias:   Takes gabapentin-had surgery for panccoast tumor and has ongoing left arm pain/paresthesias    Hypertension:  Takes minipress- 1mg daily. Knee fracture  Patient broke her femur, patient has Pain in left leg 6/10, constant, difficulty ambulating, stiffness, decreased range of motion. Patient does have neuropathy in her foot as well. Constipation:  Constipation started several months ago. She reports difficult bowel movements. Patient denies any blood in her stool/BRBPR. She denies dark stools, light stools, abdominal pain, n/v/d. Diet -   breakfast- yogurt, banana, Lunch- soup, sandwich, Dinner- night eater- she endorses eating late at night and going to bed. Review of Systems   Constitutional: Negative for chills, diaphoresis, fever, malaise/fatigue and weight loss. HENT: Negative for congestion, ear pain, hearing loss, nosebleeds, sinus pain, sore throat and tinnitus. Eyes: Negative for blurred vision, double vision, pain and discharge. Respiratory: Negative for cough, hemoptysis, shortness of breath and wheezing. Cardiovascular: Negative for chest pain, palpitations, orthopnea, claudication, leg swelling and PND. Gastrointestinal: Positive for constipation. Negative for abdominal pain, blood in stool, diarrhea, heartburn, melena, nausea and vomiting. Genitourinary: Negative for dysuria, flank pain, frequency, hematuria and urgency.    Musculoskeletal: Positive for joint pain (left leg). Negative for back pain, myalgias and neck pain. Skin: Negative for itching and rash. Neurological: Positive for tingling (bilateral feet, left arm). Negative for dizziness, speech change, focal weakness, seizures, weakness and headaches. Endo/Heme/Allergies: Negative for polydipsia. Does not bruise/bleed easily. Psychiatric/Behavioral: Negative for depression, memory loss, substance abuse and suicidal ideas. The patient is not nervous/anxious and does not have insomnia. Physical Exam   Constitutional: She is oriented to person, place, and time. She appears well-developed and well-nourished. Neck: Normal range of motion. Neck supple. Cardiovascular: Normal rate, regular rhythm, normal heart sounds and intact distal pulses. Pulmonary/Chest: Effort normal and breath sounds normal.   Abdominal: Soft. Bowel sounds are normal.   Musculoskeletal: Normal range of motion. Neurological: She is alert and oriented to person, place, and time. She has normal reflexes. Skin: Skin is warm and dry. Vitals reviewed. Visit Vitals  BP (!) 153/97   Pulse (!) 121   Temp 96.3 °F (35.7 °C) (Oral)   Resp 16   Ht 5' 8\" (1.727 m)   Wt 238 lb 9.6 oz (108.2 kg)   SpO2 96%   BMI 36.28 kg/m²       BP elevated today @ 153/97, repeat 148/90    ASSESSMENT and PLAN  Diagnoses and all orders for this visit:    1. Chronic pain of left knee  -     REFERRAL TO ORTHOPEDICS    2. Screening mammogram, encounter for  -     [facility] MAMMO BI SCREENING INCL CAD; Future    3. Urinary tract infection without hematuria, site unspecified  -     URINALYSIS W/ RFLX MICROSCOPIC; Future    4. Encounter for screening colonoscopy for non-high-risk patient  -     REFERRAL TO GASTROENTEROLOGY    5. Constipation, unspecifiedl constipation type  -     polyethylene glycol (MIRALAX) 17 gram/dose powder; Take 17 g by mouth daily.  - Increase fluid intake. - Avoid bananas, oats, breads, apples.   - Increase fluids, pears, prunes, vegetables. 6. Hypertension           - Patient states she did not take her medication this morning, needs refill.        - Continue medications as prescribed        -  Monitor blood pressure at home. Report to clinic any systolic blood pressure >132 and/or diastolic blood pressure >655. Follow-up in 1 month for lab review.

## 2019-02-05 ENCOUNTER — OFFICE VISIT (OUTPATIENT)
Dept: ORTHOPEDIC SURGERY | Age: 64
End: 2019-02-05

## 2019-02-05 ENCOUNTER — HOSPITAL ENCOUNTER (OUTPATIENT)
Dept: OCCUPATIONAL MEDICINE | Age: 64
Discharge: HOME | End: 2019-02-05
Attending: FAMILY MEDICINE

## 2019-02-05 VITALS
WEIGHT: 240 LBS | HEIGHT: 68 IN | DIASTOLIC BLOOD PRESSURE: 103 MMHG | TEMPERATURE: 97 F | OXYGEN SATURATION: 98 % | BODY MASS INDEX: 36.37 KG/M2 | SYSTOLIC BLOOD PRESSURE: 148 MMHG | HEART RATE: 98 BPM

## 2019-02-05 DIAGNOSIS — M22.2X2 PATELLOFEMORAL DISORDER, LEFT: ICD-10-CM

## 2019-02-05 DIAGNOSIS — M17.32 POST-TRAUMATIC OSTEOARTHRITIS OF LEFT KNEE: ICD-10-CM

## 2019-02-05 DIAGNOSIS — M70.52 PES ANSERINUS BURSITIS OF LEFT KNEE: ICD-10-CM

## 2019-02-05 DIAGNOSIS — M17.32 POST-TRAUMATIC OSTEOARTHRITIS OF LEFT KNEE: Primary | ICD-10-CM

## 2019-02-05 RX ORDER — MELOXICAM 15 MG/1
TABLET ORAL
Qty: 90 TAB | Refills: 0 | Status: SHIPPED | OUTPATIENT
Start: 2019-02-05

## 2019-02-05 RX ORDER — METHYLPREDNISOLONE ACETATE 40 MG/ML
40 INJECTION, SUSPENSION INTRA-ARTICULAR; INTRALESIONAL; INTRAMUSCULAR; SOFT TISSUE ONCE
Qty: 1 VIAL | Refills: 0
Start: 2019-02-05 | End: 2019-02-05

## 2019-02-05 NOTE — PROCEDURES
PROCEDURE NOTE:  Time out: 147pm  * Patient was identified by name and date of birth   * Agreement on procedure being performed was verified  * Risks and Benefits explained to the patient  * Procedure site verified and marked as necessary  * Patient was positioned for comfort  * Consent was signed and verified. Risks/benefits including but not limited to bleeding, infection, and scarring discussed and Pt wishes to proceed with procedure. The area was prepped with betadine. Ethyl chloride spray was used. Under sterile technique and with ultrasound guidance 1cc of 40mg/cc depomedrol and 4cc lidocaine were injected into left knee using lateral approach after confirming synovial fluid aspirated. Sterile gauze used to clean the area. Sterile bandage applied. Blood loss minimal.  Noticed improvement in pain Sx within 5 minutes (now rated 1/10). Tolerated procedure well. Discussed possible signs/Sx of infxn, and advised to seek care if concerned.

## 2019-02-05 NOTE — PROGRESS NOTES
AVS reviewed: YES  HEP: AT demonstrated. Pt declined Pt performance of exercises  Resources Provided: YES Both Videos & Photos.  Pt states able to access [employer]  Patient questions/concerns answered: YES  Patient verbalized understanding of treatment plan: YES

## 2019-02-05 NOTE — TELEPHONE ENCOUNTER
Requested Prescriptions     Pending Prescriptions Disp Refills    indomethacin (INDOCIN) 25 mg capsule 90 Cap 2     Sig: Take 1 Cap by mouth three (3) times daily for 90 days.

## 2019-02-05 NOTE — PROGRESS NOTES
HISTORY OF PRESENT ILLNESS    Linda Robert is a 59y.o. year old female comes in today as new patient to be evaluated and treated at the request of Mariel Trujillo PA-C for my opinion/advice regarding: left knee pain    Patients symptoms have been present for 6 years. Pain level 7/10 anterior will radiate to lateral hip, It is unchanged with braces and topical OTC meds. It is described as pain in knee much worse last 6 months and will buckle and having to use wheeled walker. Hx Fx femur at 32yo but XR today shows old Fx proximal fibula    IMAGING: XR left knee today shows significant lateral joint space loss per my review. Old Fx proximal fibula. Past Surgical History:   Procedure Laterality Date    HX HYSTERECTOMY      fibroids    HX OTHER SURGICAL      pancoast tumor removed 3 years ago per patient 2018     Social History     Socioeconomic History    Marital status:      Spouse name: Not on file    Number of children: 0    Years of education: 15    Highest education level: Not on file   Tobacco Use    Smoking status: Current Every Day Smoker     Packs/day: 0.50     Types: Cigarettes    Smokeless tobacco: Never Used    Tobacco comment: smoking since 27 y/o,    Substance and Sexual Activity    Alcohol use: No    Drug use: No    Sexual activity: Not Currently     Birth control/protection: None   Social History Narrative    Living in [place]          Current Outpatient Medications   Medication Sig Dispense Refill    polyethylene glycol (MIRALAX) 17 gram/dose powder Take 17 g by mouth daily. 238 g 1    prazosin (MINIPRESS) 1 mg capsule Take 1 Cap by mouth nightly. 90 Cap 1    albuterol (PROVENTIL HFA, VENTOLIN HFA, PROAIR HFA) 90 mcg/actuation inhaler Take 1 Puff by inhalation every four (4) hours as needed for Wheezing. 1 Inhaler 2    gabapentin (NEURONTIN) 400 mg capsule Take 1 Cap by mouth two (2) times a day.  Indications: NEUROPATHIC PAIN 60 Cap 0    QUEtiapine (SEROQUEL) 400 mg tablet Take 1 Tab by mouth nightly. Indications: DEPRESSION TREATMENT ADJUNCT 30 Tab 0    rOPINIRole (REQUIP) 1 mg tablet Take 1 Tab by mouth nightly. Indications: Restless Legs Syndrome (Patient taking differently: Take 2 mg by mouth nightly. Indications: Restless Legs Syndrome) 30 Tab 0     Past Medical History:   Diagnosis Date    Adrenal adenoma, right     Arthritis     Bipolar depression ([facility] [address] 07/14/2016    Chronic kidney disease     Degenerative arthritis of knee, bilateral     Depression     H/O renal calculi     History of low potassium     Shannan's syndrome     Hypertension     Obesity (BMI 30.0-34. 9)     PTSD (post-traumatic stress disorder) 12/28/2017    Restless leg syndrome     Schwannoma     Seizures (HCC)      Family History   Problem Relation Age of Onset    Dizziness Father     Breast Cancer Neg Hx     Colon Cancer Neg Hx          ROS:  Some swell, no bruise, numb, tingle. All other systems reviewed and negative aside from that written in the HPI. Objective:  BP (!) 148/103   Pulse 98   Temp 97 °F (36.1 °C) (Oral)   Ht 5' 8\" (1.727 m)   Wt 240 lb (108.9 kg)   SpO2 98%   BMI 36.49 kg/m²   GEN: Appears stated age in NAD. HEAD:  Normocephalic, atraumatic. NEURO:  Sensation intact light touch B/L lower extremities. MS:  LE Strength +5/5 bilateral .   left Knee:  Significant valgus deformity  1+ Effusion . Lachman negative. Valgus negative. Varus negative. negative joint line tenderness medial, lateral.  Marion negative. Posterior drawer negative. Noble compression test negative. Patellar apprehension negative. Patellar facet  positive tender to palpation medial no crepitance bilateral .  Pes anserine positive TTP left  EXT: no clubbing/cyanosis. no edema. SKIN: Warm/dry without rash. HEENT: Conjunctiva/lids WNL. External canals/nares WNL. Tongue midline. PERRL, EOMI. Hearing intact. NECK: Trachea midline. Supple, Full ROM. No thyromegaly.   CARDIAC: No edema.  LUNGS: Normal effort. ABD: Soft, NT. No HSM. PSYCH: A+O x3. Appropriate judgment and insight. Assessment/Plan:     ICD-10-CM ICD-9-CM    1. Post-traumatic osteoarthritis of left knee M17.32 715.26 XR KNEE LT MAX 2 VWS      ARTHROCENTESIS ASPIR&/INJ MAJOR JT/BURSA W/US      REFERRAL TO PHYSICAL THERAPY   2. Patellofemoral disorder, left M22.2X2 719.96 XR KNEE LT MAX 2 VWS      REFERRAL TO PHYSICAL THERAPY   3. Pes anserinus bursitis of left knee M70.52 726.61 XR KNEE LT MAX 2 VWS      REFERRAL TO PHYSICAL THERAPY       Patient (or guardian if minor) verbalizes understanding of evaluation and plan. Will inject today and Rx mobic and start PT w/ ice and HEP and return 6 weeks.

## 2019-02-05 NOTE — LETTER
NAME: Cha Stover : 1955 MRN: [id number] CONSENT FOR TREATMENT/PROCEDURE I authorize Leeroy Garber DO at [facility] to perform the medical treatment and/or procedure(s) described below:  
 
Description of Medical Treatment and/or Procedure(s): (Specify number of treatments or procedures if repeated treatment is recommended) 
 
_____________inject steroid into left knee intraarticular_________________________ I understand my provider may be assisted with significant procedural tasks by other qualified medical practitioners, who may perform important parts of the treatment/procedure(s) or assist with the administration of analgesia (pain-relieving medications) as necessary for my treatment/procedure(s). These practitioners will only perform tasks within the scope of their licensure and practice, as determined under [place] law and any other applicable regulation(s). Name and Credentials of Practitioners Who May Assist with My Treatment/Procedure(s):  
 
______________________________________________________________________ I understand that a medical company representative may be present during my treatment/procedure(s) to observe and provide verbal, technical advice to my provider. I consent to the participation of this representative in my treatment/procedure(s). My provider has explained that unforeseen conditions may be identified during the performance of my treatment/procedure(s) that necessitate an extension of the original treatment/procedure(s) or the performance of procedures other than those identified above. I consent to the performance of additional treatment/procedure(s) by my provider and the qualified medical practitioners assisting my provider as deemed necessary by my provider for treatment of my medical condition(s).   
 
I understand that certain complications may arise during the use of analgesia during my treatment/procedure(s) that may include, but are not limited to, decreased/altered awareness, respiratory problems, drug reactions, paralysis, brain damage, or possibly, death. I understand that the analgesia required for the performance of my treatment/procedure(s) involves additional risks beyond those of the treatment/procedure(s) to be performed, and authorize the use of analgesia by my provider as deemed necessary for the control of pain during my treatment/procedure(s). I understand that my provider may need to change the type of analgesia or medications used, possibly without explanation to me, and I consent to any such changes as deemed necessary by my provider for treatment of my medical condition(s). I understand that there are risks of infection and other unexpected complications associated with any medical or surgical treatment/procedure including the treatment/procedure(s) listed above or other treatment/procedure(s) necessitated by my medical condition, and that such complications may occur in the absence of any negligence on the part of my healthcare providers. My provider has explained to my satisfaction my medical condition and the specific treatment/procedure(s) recommended and identified above. I have been given an opportunity to ask and have answered to my satisfaction questions about: (CONTINUED PAGE 2) NAME: Eliel Jacob : 1955 MRN: [id number]  The nature and extent of the treatment/procedure(s) to be performed;  The benefit of treatment, and the risks associated with not having the recommended treatment/procedure(s);  The risks and possible complications associated with having the treatment, including those which, even though   
   unlikely to occur, may involve serious consequences;  
 Analgesia and alternative forms of analgesia;  Alternative procedures and methods of treatment, and the risk associated with each;  
  The expected consequences of the treatment/procedure(s) on my future health. I understand that no assurance can be given that the treatment/procedure(s) performed will be a success, and no guarantee or warranty of success for the treatment/procedure(s) has been given to me by my provider. I consent to the disposal by the examining Pathologist of any tissue removed during my treatment/procedure(s) in accordance with the receiving hospitals or laboratorys policy and any associated regulations specific to such disposal.  
 
I DO_____  DO NOT___x___ consent to other health care personnel observing my treatment/procedure(s) for the purpose of medical education or other specified purposes as may be explained by my provider. I DO_______ DO NOT____x____ consent to photography or videotaping of all or any part of my treatment/procedure(s) for medical and/or educational purposes. I understand that my identity will not be revealed in any photographs, videos, or accompanying explanations should these images be used by my healthcare providers. I certify that I have read and fully understand the above Consent for Treatment/Procedure and that all blanks were completed before I signed this consent.  
 
__________Dana Aleman_________                      _______________ Print Name of Patient or Legal Representative        Relationship to Patient (if not self) X_______________________________            __________________________ Signature of Patient (or legal representative)  Witness to signature    
 
                       __2/5/2019___/_________AM/PM 
                                                                   Date/Time (If patient is a minor or is unable to sign, complete the following) Patient is a minor, ________ years of age, or is unable to sign due to:______________________ I have explained the nature, purpose and anticipated benefits as well as any possible alternative methods of treatment, the known risks that are involved, and the possibility of complications of the proposed procedure(s) to the patient or patients legal representative. I have provided the patient or legal representative with an opportunity to ask and have answered to their satisfaction any questions about the proposed treatment/procedure(s) and alternative methods of treatment.   
 
Provider Signature:___________________  Date:__2/5/2019__Time:_____________AM/PM

## 2019-02-05 NOTE — PATIENT INSTRUCTIONS
Search YouTube for my channel:    Dr. Chrissie Cranker    Runner's Knee    For Exercises:  Repeat 10 Times  Complete 3 Sets  Perform 5 Time(s) a Week

## 2019-02-08 NOTE — TELEPHONE ENCOUNTER
I don't see that this patient takes this chronically. Can you call her and find out if she takes this every day?

## 2019-02-15 ENCOUNTER — HOSPITAL ENCOUNTER (OUTPATIENT)
Dept: PHYSICAL THERAPY | Age: 64
End: 2019-02-15

## 2019-02-27 ENCOUNTER — HOSPITAL ENCOUNTER (OUTPATIENT)
Dept: PHYSICAL THERAPY | Age: 64
Discharge: HOME | End: 2019-02-27
Payer: MEDICARE

## 2019-02-27 PROCEDURE — 97162 PT EVAL MOD COMPLEX 30 MIN: CPT

## 2019-02-27 PROCEDURE — 97110 THERAPEUTIC EXERCISES: CPT

## 2019-02-27 NOTE — PROGRESS NOTES
PT DAILY TREATMENT NOTE Patient Name: Bhavya Frank Date:2019 : 1955 [x]  Patient  Verified Payor: Adali Grant / Plan: [address] [place] [address] HMO / Product Type: Managed Care Medicare / In time:2:55  Out time:3:40 Total Treatment Time (min): 45 Total Timed Codes (min): 45 
1:1 Treatment Time ( only): 45 Visit #: 1 of  Treatment Area: Left knee SUBJECTIVE Pain Level (0-10 scale): 6 OBJECTIVE TREATMENT Modality (rationale):  
[]  E-Stim: type _ x _ min     []att   []unatt   []w/US   []w/ice   []w/heat 
[]  Traction: []cerv   []pelvic   _ lbs x _ min     []pro   []sup   []int   []const 
[]  Ultrasound: []cont   []pulse    _ W/cm2 x _  min   []1MHz   []3MHz 
[]  Iontophoresis: []take home patch w/ dexamethazone    []40mA   []80mA 
                             []_ mA min w/: []dexamethazone   []other:_ 
[]  Ice pack _  min     [] Hot pack _  min     [] Paraffin _  min 
[]  Other:  
 
Therapeutic Exercise: [] see flow sheet     [] Other:_ (minutes) :10 Added/Changed Exercises: 
[x]  Added:_See HEP  to improve (function): ability to perform gait and other dynamic movement activities []  Changed:_ to improve (function): 
 
Pain Level (0-10 scale) post treatment: 6 
 
ASSESSMENT [x]  See Plan of Care Patient is assigned a medium evaluation complexity based upon presence of arthritis, FOTO score, and evolving symptom characteristics. PLANSee Plan of Care Uvaldo Vega 2019  3:43 PM

## 2019-02-27 NOTE — PROGRESS NOTES
[address] [facility] AT [address] [place] [address] Dr. CHELA Jonas [address], [place], [address] [place] [address] Road  Phone: [phone]   [phone] PLAN OF CARE / STATEMENT OF MEDICAL NECESSITY FOR PHYSICAL THERAPY SERVICES Patient Name: Gene Ordoñez : 1955 Medical  
Diagnosis: Left knee pain [M25.562] Treatment Diagnosis: Left knee pain [M25.562] Onset Date: Chronic >5 years Referral Source: Wendy Montoya [facility] [place]): 2019 Prior Hospitalization: See medical history Provider #: [id number] Prior Level of Function: Retired, independent w/ ADL's Comorbidities: Arthritis, epilepsy history Medications: Verified on Patient Summary List  
The Plan of Care and following information is based on the information from the initial evaluation.  
=========================================================================================== Assessment / key information:  Patient is a 58 y/o female presenting to physical therapy with primary c/o left knee pain. Pt reports a traumatic fall 8 years ago and gradual onset of pain, swelling and limping over the past few years. She states at age 29 she broke the tibia; she recovered fully except for finding that the lower leg turned out to the side more. Presently she reports pain ranging from 3-10/10. Pain is worse with prolonged walking (150-200 ft max) and negotiating 4-5 stairs into her home or laundry room. She states she uses a rolling walker due to having \"terrible balance\". POSTURE: Severe left knee valgus, tibial ER; weight shifted into right side GAIT: Antalgic on left LE with alignment as above; able to climb stairs using railing but non-reciprocally ROM: Right knee +5 hyperext to 125, +15 hyper-112 left (pain at both end ranges) PALPATION: Crepitus/pain at lateral joint line; + valgus stress STRENGTH: 3+/5 left quads, 4-/5 hamstrings, 4-/5 adductors, 4-/5 glute med/max The patient was instructed in a home exercise program to address the above findings/deficits. The patient should benefit from therapy services to progress toward functional goals and improve quality of life. 
=========================================================================================== History MEDIUM  Complexity : 1-2 comorbidities / personal factors will impact the outcome/ POC ; Examination MEDIUM Complexity : 3 Standardized tests and measures addressing body structure, function, activity limitation and / or participation in recreation ; Presentation MEDIUM Complexity : Evolving with changing characteristics ; Decision Making MEDIUM Complexity : FOTO score of 26-74; Complexity MEDIUM Problem List: pain affecting function, decrease ROM, decrease strength, edema affecting function, impaired gait/ balance, decrease ADL/ functional abilitiies, decrease activity tolerance and decrease flexibility/ joint mobility Treatment Plan may include any combination of the following: Therapeutic exercise, Neuromuscular re-education, Physical agent/modality, Gait/balance training, Manual therapy, Patient education, Functional mobility training and Stair training Patient / Family readiness to learn indicated by: asking questions, trying to perform skills and interest 
Persons(s) to be included in education: patient (P) Barriers to Learning/Limitations: None Measures taken:   
Patient Goal (s): \"Not sure\" Patient self reported health status: good Rehabilitation Potential: fair ? Short Term Goals: To be accomplished in  4-6  treatments: Independent/compliant with long term HEP for lower extremity strength Pt will be able to focus on gait cues independently with walking; specifically reducing tibial external rotation ? Long Term Goals: To be accomplished in  8-12  treatments: 
Patient will report at least 50% functional improvement with stairs Pt will report 50% functional improvement with short distance walking (no device), and medium distance with walker Able to stand unsupported with narrow foot stance on foam surface to demonstrate functional balance improvement Frequency / Duration:   Patient to be seen  2  times per week for 4-6  weeks: 
Patient / Caregiver education and instruction: activity modification and exercises G-Codes (GP): ELIO Therapist Signature: Nannette Vigil PT, OCS Date: 2/27/2019 Certification Period: 2/27/19-5/26/19 Time: 2:55 PM  
=========================================================================================== I certify that the above Physical Therapy Services are being furnished while the patient is under my care. I agree with the treatment plan and certify that this therapy is necessary. Physician Signature:       Date:      Time:  Please sign and return to [facility] or you may fax the signed copy to [phone]. Thank you.

## 2019-03-05 ENCOUNTER — HOSPITAL ENCOUNTER (OUTPATIENT)
Dept: PHYSICAL THERAPY | Age: 64
Discharge: HOME | End: 2019-03-05
Payer: MEDICARE

## 2019-03-05 PROCEDURE — 97110 THERAPEUTIC EXERCISES: CPT

## 2019-03-05 NOTE — PROGRESS NOTES
PT DAILY TREATMENT NOTE     Patient Name: Ericka Montalvo  Date:3/5/2019  : 1955  [x]  Patient  Verified  Payor: Elaine Hardy / Plan: [address] [place] [address] HMO / Product Type: Managed Care Medicare /    In time:2:58  Out time:3:56  Total Treatment Time (min): 58  Total Timed Codes (min): 48  1:1 Treatment Time (min): 48   Visit #: 2 of     Treatment Area: Left knee pain [M25.562]    SUBJECTIVE  Pain Level (0-10 scale): 7  Any medication changes, allergies to medications, adverse drug reactions, diagnosis change, or new procedure performed?: [x] No    [] Yes (see summary sheet for update)  Subjective functional status/changes:   [] No changes reported  Pt reports a lot of knee pain today, but no problem with her HEP activities so far.     OBJECTIVE  Modality rationale: decrease pain to improve the patients ability to perform gait and other dynamic movement activities   Type Additional Details   [] Estim: []Att   []Unatt  []TENS instruct                 []IFC  []Premod []NMES                       []Other:  []w/US   []w/ice   []w/heat  Position:  Location:   []  Traction: [] Cervical       []Lumbar                       [] Prone          []Supine                       []Intermittent   []Continuous Lbs:  [] before manual  [] after manual   []  Ultrasound: []Continuous   [] Pulsed                           []1MHz   []3MHz Location:  W/cm2:   []  Iontophoresis with dexamethasone         Location: [] Take home patch   [] In clinic   [x]  Ice- 10 min     []  heat  []  Ice massage Position: Supine  Location: Left knee   []  Vasopneumatic Device Pressure: [] lo [] med [] hi   Temp: [] lo [] med [] hi   [] Skin assessment post-treatment:  []intact []redness- no adverse reaction       []redness  adverse reaction:       48 min Therapeutic Exercise:  [] See flow sheet :   Rationale: increase ROM, increase strength, improve balance and increase proprioception to improve the patients ability to perform gait and other dynamic movement activities    Pain Level (0-10 scale) post treatment: 7    ASSESSMENT/Changes in Function: Pt presents with severe valgus/tibial ER deformity which was addressed with form cueing as much as possible. Pt also cued and encouraged to increase weight distribution onto involved leg, and has issues with hesitancy to do so. Patient will continue to benefit from skilled PT services to modify and progress therapeutic interventions, address functional mobility deficits, address strength deficits and analyze and cue movement patterns to attain remaining goals.      PLAN  []  Upgrade activities as tolerated     [x]  Continue plan of care  []  Update interventions per flow sheet       []  Discharge due to:_  []  Other:_      Alonso Cunningham, PT 3/5/2019  11:46 AM

## 2019-03-07 ENCOUNTER — HOSPITAL ENCOUNTER (OUTPATIENT)
Dept: PHYSICAL THERAPY | Age: 64
Discharge: HOME | End: 2019-03-07
Payer: MEDICARE

## 2019-03-07 PROCEDURE — 97140 MANUAL THERAPY 1/> REGIONS: CPT

## 2019-03-07 PROCEDURE — 97110 THERAPEUTIC EXERCISES: CPT

## 2019-03-07 NOTE — PROGRESS NOTES
PT DAILY TREATMENT NOTE     Patient Name: Bhavya Frank  Date:3/7/2019  : 1955  [x]  Patient  Verified  Payor: Adali Grant / Plan: [address] [place] [address] HMO / Product Type: Managed Care Medicare /    In time:3:55  Out time:4:32  Total Treatment Time (min): 37  Total Timed Codes (min): 27  1:1 Treatment Time (min): 27   Visit #: 3 of     Treatment Area: Left knee pain [M25.562]    SUBJECTIVE  Pain Level (0-10 scale): 10/10  Any medication changes, allergies to medications, adverse drug reactions, diagnosis change, or new procedure performed?: [x] No    [] Yes (see summary sheet for update)  Subjective functional status/changes:   [] No changes reported  Pt reports she is severe pain in the lateral knee and up the side of the thigh. It did not feel like that until today, though her last therapy session was 2 days ago. She states she has also been doing her HEP strengthening exercises 2x/day and walking a lot.      OBJECTIVE  Modality rationale: decrease pain to improve the patients ability to perform gait and other dynamic movement activities   Type Additional Details   [] Estim: []Att   []Unatt  []TENS instruct                 []IFC  []Premod []NMES                       []Other:  []w/US   []w/ice   []w/heat  Position:  Location:   []  Traction: [] Cervical       []Lumbar                       [] Prone          []Supine                       []Intermittent   []Continuous Lbs:  [] before manual  [] after manual   []  Ultrasound: []Continuous   [] Pulsed                           []1MHz   []3MHz Location:  W/cm2:   []  Iontophoresis with dexamethasone         Location: [] Take home patch   [] In clinic   [x]  Ice- 10 minutes     []  heat  []  Ice massage Position: Supine  Location: Left knee   []  Vasopneumatic Device Pressure: [] lo [] med [] hi   Temp: [] lo [] med [] hi   [] Skin assessment post-treatment:  []intact []redness- no adverse reaction       []redness  adverse reaction: 19 min Therapeutic Exercise:  [] See flow sheet :   Rationale: increase strength, improve coordination and increase proprioception to improve the patients ability to perform gait and other dynamic movement activities    Manual therapy- 8 minutes- Gentle STM to left ITB and lateral knee to improve standing and walking ADL's    Pain Level (0-10 scale) post treatment: 8    ASSESSMENT/Changes in Function: Pt was held from several activities today due to elevated pain level. Gentle STM performed along ITB which was found to be tender. Explained to patient that most likely combination of starting new exercises, increased walking, and doing HEP 2x/day (versus recommended 1x/day) may have been tissue overload and should be dialed back. Patient will continue to benefit from skilled PT services to modify and progress therapeutic interventions, address functional mobility deficits, address strength deficits and analyze and cue movement patterns to attain remaining goals.     PLAN  [x]  Upgrade activities as tolerated     []  Continue plan of care  []  Update interventions per flow sheet       []  Discharge due to:_  []  Other:_      Fransisca Randle, PT 3/7/2019  12:09 PM

## 2019-03-12 ENCOUNTER — HOSPITAL ENCOUNTER (OUTPATIENT)
Dept: PHYSICAL THERAPY | Age: 64
Discharge: HOME | End: 2019-03-12
Payer: MEDICARE

## 2019-03-12 PROCEDURE — 97110 THERAPEUTIC EXERCISES: CPT

## 2019-03-12 NOTE — PROGRESS NOTES
PT DAILY TREATMENT NOTE     Patient Name: Ottoniel Abebe  Date:3/12/2019  : 1955  [x]  Patient  Verified  Payor: Ton Jackson / Plan: [address] [place] [address] HMO / Product Type: Managed Care Medicare /    In time:3:23  Out time:4:07  Total Treatment Time (min): 44  Total Timed Codes (min): 44  1:1 Treatment Time (min): 44   Visit #: 4 of     Treatment Area: Left knee pain [M25.562]    SUBJECTIVE  Pain Level (0-10 scale): 4  Any medication changes, allergies to medications, adverse drug reactions, diagnosis change, or new procedure performed?: [x] No    [] Yes (see summary sheet for update)  Subjective functional status/changes:   [] No changes reported  Pt reports doing much better than previous session and feels she is able to do more therapy activities today.     OBJECTIVE  Modality rationale: Pt deferred ice   Min Type Additional Details    [] Estim: []Att   []Unatt  []TENS instruct                 []IFC  []Premod []NMES                       []Other:  []w/US   []w/ice   []w/heat  Position:  Location:    []  Traction: [] Cervical       []Lumbar                       [] Prone          []Supine                       []Intermittent   []Continuous Lbs:  [] before manual  [] after manual    []  Ultrasound: []Continuous   [] Pulsed                           []1MHz   []3MHz Location:  W/cm2:    []  Iontophoresis with dexamethasone         Location: [] Take home patch   [] In clinic    []  Ice     []  heat  []  Ice massage Position:  Location:    []  Vasopneumatic Device Pressure: [] lo [] med [] hi   Temp: [] lo [] med [] hi   [] Skin assessment post-treatment:  []intact []redness- no adverse reaction       []redness  adverse reaction:       44 min Therapeutic Exercise:  [] See flow sheet :   Rationale: increase strength, improve coordination and increase proprioception to improve the patients ability to perform gait and other dynamic movement activities    Other Objective/Functional Measures:     Pain Level (0-10 scale) post treatment: 4    ASSESSMENT/Changes in Function: Despite pt's severe biomechanical knee deformity and gait deviation, minimal worsening was noted going from cane to no assistive device. Pt states she is interested in walking more as the weather improves, and for this purpose using her Rollator was recommended initially for improving efficiency, endurance and stability. Patient will continue to benefit from skilled PT services to modify and progress therapeutic interventions, address functional mobility deficits, address strength deficits and analyze and cue movement patterns to attain remaining goals.        PLAN  []  Upgrade activities as tolerated     [x]  Continue plan of care  []  Update interventions per flow sheet       []  Discharge due to:_  []  Other:_      Marimar Lane PT 3/12/2019  4:17 PM

## 2019-03-14 ENCOUNTER — APPOINTMENT (OUTPATIENT)
Dept: PHYSICAL THERAPY | Age: 64
End: 2019-03-14
Payer: MEDICARE

## 2019-03-19 ENCOUNTER — HOSPITAL ENCOUNTER (OUTPATIENT)
Dept: PHYSICAL THERAPY | Age: 64
Discharge: HOME | End: 2019-03-19
Payer: MEDICARE

## 2019-03-19 PROCEDURE — 97110 THERAPEUTIC EXERCISES: CPT

## 2019-03-19 PROCEDURE — 97140 MANUAL THERAPY 1/> REGIONS: CPT

## 2019-03-19 NOTE — PROGRESS NOTES
PT DAILY TREATMENT NOTE  Patient Name: Wellington Flowers Date:3/19/2019 : 1955 [x]  Patient  Verified Payor: Kieran Tucker / Plan: [address] [place] [address] HMO / Product Type: Managed Care Medicare / In time:3:31  Out time:4:34 Total Treatment Time (min): 63 Total Timed Codes (min): 53 
1:1 Treatment Time (min): 45 Visit #: 5 of  Treatment Area: Left knee pain [M25.562] SUBJECTIVE Pain Level (0-10 scale): 8/10 Any medication changes, allergies to medications, adverse drug reactions, diagnosis change, or new procedure performed?: [x] No    [] Yes (see summary sheet for update) Subjective functional status/changes:   [] No changes reported My knee has really been hurting a lot more over the past 4-5 days and I feel like it is getting worse. OBJECTIVE Modality rationale: decrease inflammation and decrease pain to improve the patients ability to improve functional abilities Min Type Additional Details  
 [] Estim: []Att   []Unatt  []TENS instruct []IFC  []Premod []NMES                       []Other:  []w/US   []w/ice   []w/heat Position: Location:  
 []  Traction: [] Cervical       []Lumbar 
                     [] Prone          []Supine []Intermittent   []Continuous Lbs: 
[] before manual 
[] after manual  
 []  Ultrasound: []Continuous   [] Pulsed []1MHz   []3MHz Location: 
W/cm2:  
 []  Iontophoresis with dexamethasone Location: [] Take home patch  
[] In clinic [x]  Ice     []  heat 
[]  Ice massage Position:supine Location:left knee x 5 minutes  
 []  Vasopneumatic Device Pressure: [] lo [] med [] hi  
Temp: [] lo [] med [] hi  
[] Skin assessment post-treatment:  []intact []redness- no adverse reaction 
     []redness  adverse reaction:  
 
 
50 min Therapeutic Exercise:  [x] See flow sheet :  
Rationale: increase ROM, increase strength, improve balance and increase proprioception to improve the patients ability to improve functional abilities 8 min Manual Therapy:  STM/tissue mobs to left lateral knee/ITB Rationale: increase ROM and increase tissue extensibility to improve functional mobility  
   
 min Patient Education: [x] Review HEP    [] Progressed/Changed HEP based on:  
[] positioning   [] body mechanics   [] transfers   [] heat/ice application Other Objective/Functional Measures:  
 
Pain Level (0-10 scale) post treatment: 6/10 ASSESSMENT/Changes in Function: Pt presented with decreased standing tolerance with therex today due to increased lateral knee pain from extreme genu valgus stresses. Pt also presented with increased pain with SLR's and active knee extension against gravity. Will review detailed progress/goals for physician update for MD follow up after next treatment with continuing to progress/advance within current POC/protocol as tolerated. Patient will continue to benefit from skilled PT services to modify and progress therapeutic interventions, address functional mobility deficits, address ROM deficits, address strength deficits, analyze and address soft tissue restrictions and analyze and cue movement patterns to attain remaining goals. []  See Plan of Care 
[]  See progress note/recertification 
[]  See Discharge Summary Progress towards goals / Updated goals: PLAN [x]  Upgrade activities as tolerated     []  Continue plan of care 
[]  Update interventions per flow sheet      
[]  Discharge due to:_ 
[]  Other:_ Chemo Conde PTA 3/19/2019  3:30 PM 
Future Appointments Date Time Provider Juan Green 3/21/2019  4:45 PM Melyssa Decree [facility]  
3/22/2019  1:00 PM Gregory, [address]  
3/26/2019  3:00 PM Melyssa Decree [facility]  
3/28/2019  3:15 PM Martinez Traore [facility]

## 2019-03-21 ENCOUNTER — HOSPITAL ENCOUNTER (OUTPATIENT)
Dept: PHYSICAL THERAPY | Age: 64
Discharge: HOME | End: 2019-03-21
Payer: MEDICARE

## 2019-03-21 PROCEDURE — 97110 THERAPEUTIC EXERCISES: CPT

## 2019-03-21 PROCEDURE — 97112 NEUROMUSCULAR REEDUCATION: CPT

## 2019-03-21 NOTE — PROGRESS NOTES
PT DAILY TREATMENT NOTE 8 Patient Name: Freddy Sutton Date:3/21/2019 : 1955 [x]  Patient  Verified Payor: Rosalee Mendoza / Plan: [address] [place] [address] HMO / Product Type: Managed Care Medicare / In time:4:42  Out time:5:32 Total Treatment Time (min): 50 Total Timed Codes (min): 40 
1:1 Treatment Time (min): 40 Visit #: 6 of 12 Treatment Area: Left knee pain [M25.562] SUBJECTIVE Pain Level (0-10 scale): 5 Any medication changes, allergies to medications, adverse drug reactions, diagnosis change, or new procedure performed?: [x] No    [] Yes (see summary sheet for update) Subjective functional status/changes:   [] No changes reported Pt reports therapy seems like it is helping in terms of her balance and her strength. OBJECTIVE Modality rationale: decrease pain to improve the patients ability to perform gait and other dynamic movement activities Min Type Additional Details  
 [] Estim: []Att   []Unatt  []TENS instruct []IFC  []Premod []NMES                       []Other:  []w/US   []w/ice   []w/heat Position: Location:  
 []  Traction: [] Cervical       []Lumbar 
                     [] Prone          []Supine []Intermittent   []Continuous Lbs: 
[] before manual 
[] after manual  
 []  Ultrasound: []Continuous   [] Pulsed []1MHz   []3MHz Location: 
W/cm2:  
 []  Iontophoresis with dexamethasone Location: [] Take home patch  
[] In clinic  
10 [x]  Ice     []  heat 
[]  Ice massage Position: Supine Location: Left knee  
 []  Vasopneumatic Device Pressure: [] lo [] med [] hi  
Temp: [] lo [] med [] hi  
[] Skin assessment post-treatment:  []intact []redness- no adverse reaction 
     []redness  adverse reaction:  
 
 
30 min Therapeutic Exercise:  [] See flow sheet :  
Rationale: increase ROM, increase strength, improve coordination and improve balance to improve the patients ability to perform gait and other dynamic movement activities 10 min Neuromuscular Re-education:  []  See flow sheet :  
Rationale: improve balance and increase proprioception  to improve the patients ability to perform gait and other dynamic movement activities Pain Level (0-10 scale) post treatment: 0 
 
ASSESSMENT/Changes in Function: Placed small ball between patient's knees on [employer] squats as a barrier to valgus knee collapse. Pt was not cued to squeeze the ball. This resulted in improved performance of the exercise with less pain reported by patient. Patient will continue to benefit from skilled PT services to modify and progress therapeutic interventions, address strength deficits and analyze and cue movement patterns to attain remaining goals. PLAN [x]  Upgrade activities as tolerated     []  Continue plan of care 
[]  Update interventions per flow sheet      
[]  Discharge due to:_ 
[]  Other:_ Uvaldo Caro 3/21/2019  6:32 PM

## 2019-03-25 RX ORDER — INDOMETHACIN 25 MG/1
25 CAPSULE ORAL 3 TIMES DAILY
Qty: 90 CAP | Refills: 2 | Status: CANCELLED | OUTPATIENT
Start: 2019-03-25 | End: 2019-06-23

## 2019-03-26 ENCOUNTER — HOSPITAL ENCOUNTER (OUTPATIENT)
Dept: PHYSICAL THERAPY | Age: 64
Discharge: HOME | End: 2019-03-26
Payer: MEDICARE

## 2019-03-26 PROCEDURE — 97140 MANUAL THERAPY 1/> REGIONS: CPT

## 2019-03-26 PROCEDURE — 97110 THERAPEUTIC EXERCISES: CPT

## 2019-03-26 NOTE — PROGRESS NOTES
PT DAILY TREATMENT NOTE 8 Patient Name: Lucinda Cook Date:3/26/2019 : 1955 [x]  Patient  Verified Payor: Marilyn Rey / Plan: [address] [place] [address] HMO / Product Type: Managed Care Medicare / In time:3:00  Out time:3:50 Total Treatment Time (min): 50 Total Timed Codes (min): 40 
1:1 Treatment Time (min): 40 Visit #: 7 of 12 Treatment Area: Left knee pain [M25.562] SUBJECTIVE Pain Level (0-10 scale): 6 Any medication changes, allergies to medications, adverse drug reactions, diagnosis change, or new procedure performed?: [x] No    [] Yes (see summary sheet for update) Subjective functional status/changes:   [] No changes reported See Progress Note OBJECTIVE Modality rationale: decrease pain to improve the patients ability to perform gait and other dynamic movement activities Min Type Additional Details  
 [] Estim: []Att   []Unatt  []TENS instruct []IFC  []Premod []NMES                       []Other:  []w/US   []w/ice   []w/heat Position: Location:  
 []  Traction: [] Cervical       []Lumbar 
                     [] Prone          []Supine []Intermittent   []Continuous Lbs: 
[] before manual 
[] after manual  
 []  Ultrasound: []Continuous   [] Pulsed []1MHz   []3MHz Location: 
W/cm2:  
 []  Iontophoresis with dexamethasone Location: [] Take home patch  
[] In clinic  
10 [x]  Ice     []  heat 
[]  Ice massage Position: Seated Location: Left knee  
 []  Vasopneumatic Device Pressure: [] lo [] med [] hi  
Temp: [] lo [] med [] hi  
[] Skin assessment post-treatment:  []intact []redness- no adverse reaction 
     []redness  adverse reaction:  
 
 
30 min Therapeutic Exercise:  [] See flow sheet :  
Rationale: increase ROM, increase strength and improve coordination to improve the patients ability to perform gait and other dynamic movement activities 10 min Neuromuscular Re-education:  []  See flow sheet :  
Rationale: improve balance and increase proprioception  to improve the patients ability to perform gait and other dynamic movement activities Pain Level (0-10 scale) post treatment: 3 
 
ASSESSMENT/Changes in Function:  
Patient will continue to benefit from skilled PT services to modify and progress therapeutic interventions, address functional mobility deficits, address strength deficits and analyze and cue movement patterns to attain remaining goals. []  See Plan of Care [x]  See progress note/recertification 
[]  See Discharge Summary PLAN 
[]  Upgrade activities as tolerated     [x]  Continue plan of care 
[]  Update interventions per flow sheet      
[]  Discharge due to:_ 
[]  Other:_ Uvaldo Ewing 3/26/2019  2:58 PM

## 2019-03-26 NOTE — PROGRESS NOTES
[facility] AT [address] [place] [address] Dr. CHELA Austinaña [address], [place], [address] [place] [address] Road  Phone: [phone]   [phone] PROGRESS NOTE Patient Name: Thierno Guajardo : 1955 Treatment/Medical Diagnosis: Left knee pain [M25.562] Referral Source: Asia Mora DO Date of Initial Visit: 19 Attended Visits: 7 Missed Visits: 0  
 
SUMMARY OF TREATMENT Therapeutic exercise for lower extremity strength, balance/proprioception and gait training, pain modalities, HEP 
CURRENT STATUS Pt reports approximately 50% overall improvement · Short Term Goals: To be accomplished in  4-6  treatments: Independent/compliant with long term HEP for lower extremity strength- Met and ongoing Pt will be able to focus on gait cues independently with walking; specifically reducing tibial external rotation- Met · Long Term Goals: To be accomplished in  8-12  treatments: 
Patient will report at least 50% functional improvement with stairs- Met, pt reports 50% improvement, but still descends sideways Pt will report 50% functional improvement with short distance walking (no device), and medium distance with walker- Met, pt reports 50% improvement Able to stand unsupported with narrow foot stance on foam surface to demonstrate functional balance improvement- Met, pt able to stand 30 seconds New goal: Pt will report >50% improvement, specifically with descending stairs- To be met in 6-8 visits RECOMMENDATIONS Continue therapy 2x/week for 3-4 weeks to progress gait, balance and closed chain knee stability to improve ADL's. If you have any questions/comments please contact us directly at [phone]. Thank you for allowing us to assist in the care of your patient. Therapist Signature: Dillon Sanchez, PT, OCS Date: 3/26/2019   Time: 3:14 PM  
NOTE TO PHYSICIAN:  PLEASE COMPLETE THE ORDERS BELOW AND FAX TO  
 [facility] at [facility] UNIT: [phone]. If you are unable to process this request in 24 hours please contact our office: [phone]. 
 
___ I have read the above report and request that my patient continue as recommended.  
___ I have read the above report and request that my patient continue therapy with the following changes/special instructions:_________________________________________________________  
___ I have read the above report and request that my patient be discharged from therapy.   
 
Physician Signature:        Date:       Time:

## 2019-03-28 ENCOUNTER — APPOINTMENT (OUTPATIENT)
Dept: PHYSICAL THERAPY | Age: 64
End: 2019-03-28
Payer: MEDICARE

## 2019-04-02 ENCOUNTER — APPOINTMENT (OUTPATIENT)
Dept: PHYSICAL THERAPY | Age: 64
End: 2019-04-02
Payer: MEDICARE

## 2019-04-04 ENCOUNTER — HOSPITAL ENCOUNTER (OUTPATIENT)
Dept: PHYSICAL THERAPY | Age: 64
Discharge: HOME | End: 2019-04-04
Payer: MEDICARE

## 2019-04-04 PROCEDURE — 97110 THERAPEUTIC EXERCISES: CPT

## 2019-04-04 NOTE — PROGRESS NOTES
PT DAILY TREATMENT NOTE 8 Patient Name: Shane Martins Date:2019 : 1955 [x]  Patient  Verified Payor: Tiffany Estrella / Plan: [address] [place] [address] HMO / Product Type: Managed Care Medicare / In time:2:56  Out time:3:46 Total Treatment Time (min): 50 Total Timed Codes (min): 40 
1:1 Treatment Time (min): 40 Visit #: 8 of 12 Treatment Area: Left knee pain [M25.562] SUBJECTIVE Pain Level (0-10 scale): 3 Any medication changes, allergies to medications, adverse drug reactions, diagnosis change, or new procedure performed?: [x] No    [] Yes (see summary sheet for update) Subjective functional status/changes:   [] No changes reported Pt reports she walked a lot while she was at the [place] [facility] last week, but mostly up and down stairs. She did not feel confident attempting to walk in the sand. OBJECTIVE Modality rationale: decrease pain to improve the patients ability to perform gait and other dynamic movement activities Min Type Additional Details  
 [] Estim: []Att   []Unatt  []TENS instruct []IFC  []Premod []NMES                       []Other:  []w/US   []w/ice   []w/heat Position: Location:  
 []  Traction: [] Cervical       []Lumbar 
                     [] Prone          []Supine []Intermittent   []Continuous Lbs: 
[] before manual 
[] after manual  
 []  Ultrasound: []Continuous   [] Pulsed []1MHz   []3MHz Location: 
W/cm2:  
 []  Iontophoresis with dexamethasone Location: [] Take home patch  
[] In clinic  
10 [x]  Ice     []  heat 
[]  Ice massage Position: Supine Location: Left knee  
 []  Vasopneumatic Device Pressure: [] lo [] med [] hi  
Temp: [] lo [] med [] hi  
[] Skin assessment post-treatment:  []intact []redness- no adverse reaction 
     []redness  adverse reaction:  
 
 
40 min Therapeutic Exercise:  [] See flow sheet :  
 Rationale: increase strength, improve coordination and increase proprioception to improve the patients ability to perform gait and other dynamic movement activities Pain Level (0-10 scale) post treatment: 4 
 
ASSESSMENT/Changes in Function: Pt was able to progress from stepping over cones to 6\" hurdles. Also added side stepping over 12\" hurdles to simulate stepping in and out of bath tub, which pt reported difficulty with. Patient will continue to benefit from skilled PT services to modify and progress therapeutic interventions, address functional mobility deficits, address strength deficits and analyze and cue movement patterns to attain remaining goals. PLAN 
[]  Upgrade activities as tolerated     [x]  Continue plan of care 
[]  Update interventions per flow sheet      
[]  Discharge due to:_ 
[]  Other:_ Uvaldo Pastor 4/4/2019  5:16 PM 
 
 
 
 
 
 Statement Selected

## 2019-04-09 ENCOUNTER — APPOINTMENT (OUTPATIENT)
Dept: PHYSICAL THERAPY | Age: 64
End: 2019-04-09
Payer: MEDICARE

## 2019-04-12 ENCOUNTER — HOSPITAL ENCOUNTER (OUTPATIENT)
Dept: PHYSICAL THERAPY | Age: 64
Discharge: HOME | End: 2019-04-12
Payer: MEDICARE

## 2019-04-12 PROCEDURE — 97110 THERAPEUTIC EXERCISES: CPT

## 2019-04-12 NOTE — PROGRESS NOTES
PT DAILY TREATMENT NOTE  Patient Name: Anthony Santos Date:2019 : 1955 [x]  Patient  Verified Payor: Gissel Marquez / Plan: [address] [place] [address] HMO / Product Type: Managed Care Medicare / In time:2:31  Out time:3:24 Total Treatment Time (min): 53 Total Timed Codes (min): 38 
1:1 Treatment Time (min): 38 Visit #:  12 Treatment Area: Left knee pain [M25.562] SUBJECTIVE Pain Level (0-10 scale): 9/10 Any medication changes, allergies to medications, adverse drug reactions, diagnosis change, or new procedure performed?: [x] No    [] Yes (see summary sheet for update) Subjective functional status/changes:   [] No changes reported I have had a rough week with my knee and actually my whole leg because I have been on my feet a lot with doing things around the house and in the yard as well as running errands out and about. OBJECTIVE Modality rationale: decrease inflammation and decrease pain to improve the patients ability to improve functional abilities Min Type Additional Details  
 [] Estim: []Att   []Unatt  []TENS instruct []IFC  []Premod []NMES                       []Other:  []w/US   []w/ice   []w/heat Position: Location:  
 []  Traction: [] Cervical       []Lumbar 
                     [] Prone          []Supine []Intermittent   []Continuous Lbs: 
[] before manual 
[] after manual  
 []  Ultrasound: []Continuous   [] Pulsed []1MHz   []3MHz Location: 
W/cm2:  
 []  Iontophoresis with dexamethasone Location: [] Take home patch  
[] In clinic  
10 [x]  Ice     []  heat 
[]  Ice massage Position:supine Location:left knee  
 []  Vasopneumatic Device Pressure: [] lo [] med [] hi  
Temp: [] lo [] med [] hi  
[] Skin assessment post-treatment:  []intact []redness- no adverse reaction 
     []redness  adverse reaction:  
 
 
43 min Therapeutic Exercise:  [x] See flow sheet :  
 Rationale: increase ROM, increase strength, improve balance and increase proprioception to improve the patients ability to improve functional abilities 
 
       
 min Patient Education: [x] Review HEP    [] Progressed/Changed HEP based on:  
[] positioning   [] body mechanics   [] transfers   [] heat/ice application Other Objective/Functional Measures:  
 
Pain Level (0-10 scale) post treatment: 2/10 ASSESSMENT/Changes in Function: Pt presented with chief c/o increased intensity lateral>medial knee pain from increased standing/walking activity since last treatment. Pt was able to tolerate full normal therex regiment with moderate challenge except for declining last 2 exercises due to increased fatigue and pain. Will continue to progress/advance patient within current POC as tolerated with monitoring symptoms. Patient will continue to benefit from skilled PT services to modify and progress therapeutic interventions, address functional mobility deficits, address ROM deficits, address strength deficits and analyze and cue movement patterns to attain remaining goals. []  See Plan of Care 
[]  See progress note/recertification 
[]  See Discharge Summary Progress towards goals / Updated goals: PLAN [x]  Upgrade activities as tolerated     []  Continue plan of care 
[]  Update interventions per flow sheet      
[]  Discharge due to:_ 
[]  Other:_ Yomaira Wilson, PTA 4/12/2019  2:35 PM

## 2019-04-16 ENCOUNTER — HOSPITAL ENCOUNTER (OUTPATIENT)
Dept: PHYSICAL THERAPY | Age: 64
Discharge: HOME | End: 2019-04-16
Payer: MEDICARE

## 2019-04-16 PROCEDURE — 97110 THERAPEUTIC EXERCISES: CPT

## 2019-04-16 NOTE — PROGRESS NOTES
PT DAILY TREATMENT NOTE 8- Patient Name: Ngozi Easley Date:2019 : 1955 [x]  Patient  Verified Payor: Kalyani Colon / Plan: [address] [place] [address] HMO / Product Type: Managed Care Medicare / In time:3:01  Out time:3:50 Total Treatment Time (min): 49 Total Timed Codes (min): 39 
1:1 Treatment Time (min): 39 Visit #: 10 of 12 Treatment Area: Left knee pain [M25.562] SUBJECTIVE Pain Level (0-10 scale): 3-4 Any medication changes, allergies to medications, adverse drug reactions, diagnosis change, or new procedure performed?: [x] No    [] Yes (see summary sheet for update) Subjective functional status/changes:   [] No changes reported Pt reports she thinks therapy is helping overall, but she will occasionally have a really painful day where she can barely get around. OBJECTIVE Modality rationale: decrease pain to improve the patients ability to perform gait and other dynamic movement activities Min Type Additional Details  
 [] Estim: []Att   []Unatt  []TENS instruct []IFC  []Premod []NMES                       []Other:  []w/US   []w/ice   []w/heat Position: Location:  
 []  Traction: [] Cervical       []Lumbar 
                     [] Prone          []Supine []Intermittent   []Continuous Lbs: 
[] before manual 
[] after manual  
 []  Ultrasound: []Continuous   [] Pulsed []1MHz   []3MHz Location: 
W/cm2:  
 []  Iontophoresis with dexamethasone Location: [] Take home patch  
[] In clinic  
10 [x]  Ice     []  heat 
[]  Ice massage Position: Supine Location: Left knee  
 []  Vasopneumatic Device Pressure: [] lo [] med [] hi  
Temp: [] lo [] med [] hi  
[] Skin assessment post-treatment:  []intact []redness- no adverse reaction 
     []redness  adverse reaction:  
 
 
39 min Therapeutic Exercise:  [] See flow sheet :  
Rationale: increase ROM, increase strength and increase proprioception to improve the patients ability to perform gait and other dynamic movement activities Pain Level (0-10 scale) post treatment: 3 
 
ASSESSMENT/Changes in Function: Pt presents with severe joint crepitus and palpable med/lat tibiofemoral translation when performing clamshells. This movement was also reported as painful and was subsequently discontinued. Spent considerable time during ice today discussing likelihood to recommend discharge after 12 visits for ortho/surgical consult. Pt understands she is not obligated to undergo surgery, but realizes potential for further treatment with therapy is limited. Patient will continue to benefit from skilled PT services to modify and progress therapeutic interventions, address functional mobility deficits, address strength deficits and analyze and cue movement patterns to attain remaining goals. PLAN 
[]  Upgrade activities as tolerated     [x]  Continue plan of care 
[]  Update interventions per flow sheet      
[]  Discharge due to:_ 
[]  Other:_ Norma Cabrales [place] 4/16/2019  3:29 PM

## 2019-04-18 ENCOUNTER — HOSPITAL ENCOUNTER (OUTPATIENT)
Dept: PHYSICAL THERAPY | Age: 64
Discharge: HOME | End: 2019-04-18
Payer: MEDICARE

## 2019-04-18 PROCEDURE — 97110 THERAPEUTIC EXERCISES: CPT

## 2019-04-18 NOTE — PROGRESS NOTES
PT DAILY TREATMENT NOTE 8-14 Patient Name: Fatoumata Leal Date:2019 : 1955 [x]  Patient  Verified Payor: Silver Degroot / Plan: [address] [place] [address] HMO / Product Type: Managed Care Medicare / In time:3:10  Out time:4:00 Total Treatment Time (min): 50 Total Timed Codes (min): 40 
1:1 Treatment Time (min): 40 Visit #: 29 KI 50 Treatment Area: Left knee pain [M25.562] SUBJECTIVE Pain Level (0-10 scale): 8 Any medication changes, allergies to medications, adverse drug reactions, diagnosis change, or new procedure performed?: [x] No    [] Yes (see summary sheet for update) Subjective functional status/changes:   [] No changes reported Pt states that today is one of those days where everything seems to hurt. She has been out doing errands for a while today as well, but uses her RW for longer distances. OBJECTIVE Modality rationale: decrease pain to improve the patients ability to  
perform gait and other dynamic movement activities Min Type Additional Details  
 [] Estim: []Att   []Unatt  []TENS instruct []IFC  []Premod []NMES                       []Other:  []w/US   []w/ice   []w/heat Position: Location:  
 []  Traction: [] Cervical       []Lumbar 
                     [] Prone          []Supine []Intermittent   []Continuous Lbs: 
[] before manual 
[] after manual  
 []  Ultrasound: []Continuous   [] Pulsed []1MHz   []3MHz Location: 
W/cm2:  
 []  Iontophoresis with dexamethasone Location: [] Take home patch  
[] In clinic  
10 [x]  Ice     []  heat 
[]  Ice massage Position: Supine Location: Left knee  
 []  Vasopneumatic Device Pressure: [] lo [] med [] hi  
Temp: [] lo [] med [] hi  
[] Skin assessment post-treatment:  []intact []redness- no adverse reaction 
     []redness  adverse reaction:  
 
 
40 min Therapeutic Exercise:  [] See flow sheet :  
 Rationale: increase strength and increase proprioception to improve the patients ability to perform gait and other dynamic movement activities Pain Level (0-10 scale) post treatment: 5 
 
ASSESSMENT/Changes in Function: Pt required cues with standing balance exercises to increase flexion in left knee (avoiding recurvatum position). This was able to further improve quad Patient will continue to benefit from skilled PT services to modify and progress therapeutic interventions, address functional mobility deficits, address strength deficits and analyze and cue movement patterns to attain remaining goals. PLAN [x]  Upgrade activities as tolerated     []  Continue plan of care 
[]  Update interventions per flow sheet      
[]  Discharge due to:_ 
[]  Other:_ Uvaldo Valencia 4/18/2019  2:02 PM

## 2019-04-23 ENCOUNTER — APPOINTMENT (OUTPATIENT)
Dept: PHYSICAL THERAPY | Age: 64
End: 2019-04-23
Payer: MEDICARE

## 2019-04-25 ENCOUNTER — APPOINTMENT (OUTPATIENT)
Dept: PHYSICAL THERAPY | Age: 64
End: 2019-04-25
Payer: MEDICARE

## 2019-04-30 ENCOUNTER — APPOINTMENT (OUTPATIENT)
Dept: PHYSICAL THERAPY | Age: 64
End: 2019-04-30
Payer: MEDICARE

## 2019-08-12 RX ORDER — PRAZOSIN HYDROCHLORIDE 1 MG/1
1 CAPSULE ORAL
Qty: 30 CAP | Refills: 0 | Status: SHIPPED | OUTPATIENT
Start: 2019-08-12

## 2019-08-12 NOTE — TELEPHONE ENCOUNTER
Needs apt, last RF until patient comes in. Yady Ruiz PA-C  [facility]  [address]  [address]

## 2019-12-16 NOTE — PROGRESS NOTES
[facility]  Tyree Jonas [address], [place], [address] - Phone: [phone]  Fax: [phone] FOR PHYSICAL THERAPY          Patient Name: Itz Enrique : 1955   Treatment/Medical Diagnosis: Left knee pain [M25.562]   Onset Date: chronic    Referral Source: Yesica Vázquez DO Start of Care [facility]): 19   Prior Hospitalization: See Medical History Provider #: [id number]   Prior Level of Function: Retired, I w/ ALs   Comorbidities: Arthritis, epilepsy hx   Medications: Verified on Patient Summary List   Visits from [facility]: 11 Missed Visits: 1     Key Functional Changes/Progress: Pt attended 11 sessions of PT for the tx of L knee pain. Pt contacted clinic on  to cx appointment due to falling and being in too much pain. Pt cancelled remaining appts to await f/u with physician. Unfortunately, pt failed to return to PT or contact clinic w/ status update, and is therefore DC'd from our care. Assessments/Recommendations: Discontinue therapy due to patient not returning. If you have any questions/comments please contact us directly at [phone]. Thank you for allowing us to assist in the care of your patient.     Therapist Signature: Cesia Wang PT Date: 19   Reporting Period: 3/26/19 - 19 Time: 1:26 PM

## 2021-08-03 PROBLEM — I10 HYPERTENSION: Status: RESOLVED | Noted: 2021-08-03 | Resolved: 2021-08-03

## 2023-07-28 NOTE — TELEPHONE ENCOUNTER
Called pt three times but no answer, left vm.     GI staff - pl call to relay results/recs and to set up follow up in ~1 month. OK to use an MD approval slot. Her pathology from the EGD shows that she has eosinophilic esophagitis. Recommend taking protonix 40 mg twice a day and following up with me in GI clinic in ~1 month to discuss all of her options and timing of a repeat EGD. I called in a new scrip for the higher dose of protonix.     Thanks, SS Inform patient of 2 findings from MRI. Inform her to follow up with  to find out where and to get more information on the lesions. Patient states that she picked up her antibiotic. Encourage patient to complete antibiotic and keep appt with . Patient verbalized understanding of conversation.

## 2024-11-12 NOTE — MR AVS SNAPSHOT
Visit Information Date & Time Provider Department Dept. Phone Encounter #  
 7/24/2017  3:30 PM Caitlyn Cummings NP [address] [phone] [id number] Upcoming Health Maintenance Date Due Hepatitis C Screening 1955 Pneumococcal 19-64 Medium Risk (1 of 1 - PPSV23) 4/26/1975 DTaP/Tdap/Td series (1 - Tdap) 4/26/1977 PAP AKA CERVICAL CYTOLOGY 4/26/1977 FOBT Q 1 YEAR AGE 50-75 4/26/2006 ZOSTER VACCINE AGE 60> 2/26/2016 BREAST CANCER SCRN MAMMOGRAM 7/30/2016 INFLUENZA AGE 9 TO ADULT 8/1/2017 Allergies as of 7/24/2017  Review Complete On: 7/24/2017 By: Caitlyn Cummings NP Severity Noted Reaction Type Reactions Morphine High 07/14/2016    Anaphylaxis Current Immunizations  Reviewed on 12/13/2016 Name Date Influenza Vaccine (Quad) PF 12/13/2016 Not reviewed this visit You Were Diagnosed With   
  
 Codes Comments Dizziness    -  Primary ICD-10-CM: K49 ICD-9-CM: 780.4 Vertigo     ICD-10-CM: P55 ICD-9-CM: 780. 4 Thyroid nodule     ICD-10-CM: E04.1 ICD-9-CM: 241.0 Essential hypertension with goal blood pressure less than 140/90     ICD-10-CM: I10 
ICD-9-CM: 401.9 Vitals BP Pulse Temp Resp Height(growth percentile) Weight(growth percentile) 101/78 91 96.7 °F (35.9 °C) (Oral) 18 5' 8\" (1.727 m) 201 lb (91.2 kg) SpO2 BMI OB Status Smoking Status 98% 30.56 kg/m2 Hysterectomy Current Every Day Smoker Vitals History BMI and BSA Data Body Mass Index Body Surface Area 30.56 kg/m 2 2.09 m 2 Preferred Pharmacy Pharmacy Name Phone [facility] [address], [address] [place] [phone] Your Updated Medication List  
  
   
This list is accurate as of: 7/24/17  3:48 PM.  Always use your most recent med list.  
  
  
  
  
 ARIPiprazole 15 mg tablet Commonly known as:  ABILIFY Take 15 mg by mouth daily. gabapentin 600 mg tablet Commonly known as:  NEURONTIN Take  by mouth three (3) times daily. hydrOXYzine HCl 50 mg tablet Commonly known as:  ATARAX Take 50 mg by mouth three (3) times daily as needed for Itching. indomethacin 25 mg capsule Commonly known as:  INDOCIN Take 2 Caps by mouth three (3) times daily. lamoTRIgine 25 mg tablet Commonly known as: LaMICtal  
Take  by mouth daily. lisinopril 5 mg tablet Commonly known as:  Kathyleen Johnnie Take 1 Tab by mouth daily. meclizine 25 mg tablet Commonly known as:  ANTIVERT Take 1 Tab by mouth three (3) times daily as needed for up to 10 days. potassium chloride SA 10 mEq capsule Commonly known as:  Darilyn Rising Take 1 Cap by mouth two (2) times a day. QUEtiapine 50 mg tablet Commonly known as:  SEROquel Take 75 mg by mouth daily. rOPINIRole 0.25 mg tablet Commonly known as:  Gwendloyn Kings Take  by mouth three (3) times daily. sodium bicarbonate 650 mg tablet Take  by mouth four (4) times daily. traMADol 50 mg tablet Commonly known as:  ULTRAM  
Take 1 Tab by mouth two (2) times daily as needed for Pain. Max Daily Amount: 100 mg. Prescriptions Sent to Pharmacy Refills  
 meclizine (ANTIVERT) 25 mg tablet 1 Sig: Take 1 Tab by mouth three (3) times daily as needed for up to 10 days. Class: Normal  
 Pharmacy: [address] [place] [address] Ph #: [phone] Route: Oral  
  
To-Do List   
 07/24/2017 Lab:  MAGNESIUM   
  
 07/24/2017 Lab:  METABOLIC PANEL, COMPREHENSIVE   
  
 07/24/2017 Imaging:  MRI IAC W WO CONT   
  
 07/24/2017 Lab:  T4, FREE   
  
 07/24/2017 Lab:  TSH 3RD GENERATION Referral Information Referral ID Referred By Referred To  
  
 [id number] Aggie Garlanding K Not Available Visits Status Start Date End Date 1 New Request 7/24/17 7/24/18 If your referral has a status of pending review or denied, additional information will be sent to support the outcome of this decision. Patient Instructions I have put in an order for MRI of your brain. Expect a call from [facility] to set this up. Take meclizine (antivert) up to 3x/day as needed to help with the dizziness. Make sure you are drinking at least 64 oz of water/day. Vertigo: Care Instructions Your Care Instructions Vertigo is the feeling that you or your surroundings are moving when there is no actual movement. It is often described as a feeling of spinning, whirling, falling, or tilting. Vertigo may make you vomit or feel nauseated. You may have trouble standing or walking and may lose your balance. Vertigo is often related to an inner ear problem, but it can have other more serious causes. If vertigo continues, you may need more tests to find its cause. Follow-up care is a key part of your treatment and safety. Be sure to make and go to all appointments, and call your doctor if you are having problems. Its also a good idea to know your test results and keep a list of the medicines you take. How can you care for yourself at home? · Do not lie flat on your back. Prop yourself up slightly. This may reduce the spinning feeling. Keep your eyes open. · Move slowly so that you do not fall. · If your doctor recommends medicine, take it exactly as directed. · Do not drive while you are having vertigo. Certain exercises, called Mccrary-Daroff exercises, can help decrease vertigo. To do Mccrary-Daroff exercises: · Sit on the edge of a bed or sofa and quickly lie down on the side that causes the worst vertigo. Lie on your side with your ear down. · Stay in this position for at least 30 seconds or until the vertigo goes away. · Sit up. If this causes vertigo, wait for it to stop. · Repeat the procedure on the other side. · Repeat this 10 times. Do these exercises 2 times a day until the vertigo is gone. When should you call for help? Call 911 anytime you think you may need emergency care. For example, call if: 
· You passed out (lost consciousness). · You have symptoms of a stroke. These may include: 
¨ Sudden numbness, tingling, weakness, or loss of movement in your face, arm, or leg, especially on only one side of your body. ¨ Sudden vision changes. ¨ Sudden trouble speaking. ¨ Sudden confusion or trouble understanding simple statements. ¨ Sudden problems with walking or balance. ¨ A sudden, severe headache that is different from past headaches. Call your doctor now or seek immediate medical care if: · Vertigo occurs with a fever, a headache, or ringing in your ears. · You have new or increased nausea and vomiting. Watch closely for changes in your health, and be sure to contact your doctor if: · Vertigo gets worse or happens more often. · Vertigo has not gotten better after 2 weeks. Where can you learn more? Go to http://paulette-faizan.info/. Enter T528 in the search box to learn more about \"Vertigo: Care Instructions. \" Current as of: July 29, 2016 Content Version: 11.3 © [id number] [employer]. Care instructions adapted under license by [employer] (which disclaims liability or warranty for this information). If you have questions about a medical condition or this instruction, always ask your healthcare professional. [place] [address] any warranty or liability for your use of this information. Introducing [facility]! [facility] introduces [employer] patient portal. Now you can access parts of your medical record, email your doctor's office, and request medication refills online. 1. In your internet browser, go to [email or web address] 2. Click on the First Time User? Click Here link in the Sign In box.  You will see the New Member Sign Up page. 3. Enter your [employer] Access Code exactly as it appears below. You will not need to use this code after youve completed the sign-up process. If you do not sign up before the expiration date, you must request a new code. · [employer] Access Code: [email or web address] Expires: 10/22/2017  3:12 PM 
 
4. Enter the last four digits of your Social Security Number (xxxx) and Date of Birth (mm/dd/yyyy) as indicated and click Submit. You will be taken to the next sign-up page. 5. Create a [employer] ID. This will be your [employer] login ID and cannot be changed, so think of one that is secure and easy to remember. 6. Create a [employer] password. You can change your password at any time. 7. Enter your Password Reset Question and Answer. This can be used at a later time if you forget your password. 8. Enter your e-mail address. You will receive e-mail notification when new information is available in [address] 9. Click Sign Up. You can now view and download portions of your medical record. 10. Click the Download Summary menu link to download a portable copy of your medical information. If you have questions, please visit the Frequently Asked Questions section of the [employer] website. Remember, [employer] is NOT to be used for urgent needs. For medical emergencies, dial 911. Now available from your iPhone and Android! Please provide this summary of care documentation to your next provider. Your primary care clinician is listed as Nellie Guzman. If you have any questions after today's visit, please call [phone]. 5